# Patient Record
Sex: FEMALE | Race: WHITE | Employment: UNEMPLOYED
[De-identification: names, ages, dates, MRNs, and addresses within clinical notes are randomized per-mention and may not be internally consistent; named-entity substitution may affect disease eponyms.]

---

## 2017-08-08 PROBLEM — F33.2 MAJOR DEPRESSIVE DISORDER, RECURRENT SEVERE WITHOUT PSYCHOTIC FEATURES (HCC): Status: ACTIVE | Noted: 2017-08-08

## 2018-03-20 ENCOUNTER — NURSE TRIAGE (OUTPATIENT)
Dept: OTHER | Facility: CLINIC | Age: 33
End: 2018-03-20

## 2018-03-20 NOTE — TELEPHONE ENCOUNTER
Reason for Disposition   Message left on identified answering machine. Protocols used: NO CONTACT OR DUPLICATE CONTACT CALL-ADULT-     Attempted to reach caller, Renato Powell, to follow up on prior call from today. Message left to return call to RN Access.

## 2018-03-20 NOTE — TELEPHONE ENCOUNTER
Reason for Disposition   [1] Follow-up call to recent contact AND [2] information only call, no triage required    Protocols used: INFORMATION ONLY CALL-ADULT-KATHRYN    Spoke with [place] regarding her call earlier today about [place]. [facility] reports Rendell Crew is still a little \"off\" and is still refusing care. [facility] states she does not feel that Rendell Crew is unsafe at this time . 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 Informed [facility] to call back at any time if needed and that Rendell Crew can call at any time as well. [employer] phone number and explanation of this service explained, phone number declined at this time.

## 2018-03-20 NOTE — TELEPHONE ENCOUNTER
Reason for Disposition   Caller hangs up    Answer Assessment - Initial Assessment Questions  1. SITUATION:  Document reason for call. [facility] called for pt Syeda for chief complaint of anxiety attack. 2. BACKGROUND: Document any background information (e.g., prior calls, known psychiatric history)      No known psychiatric history or prior calls. 3. ASSESSMENT: Document your nursing assessment. 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 She was crying and not answering questions when asked. 4. RESPONSE: Document what your response or recommendation was. Answer Assessment - Initial Assessment Questions  1. CONCERN: \"What happened that made you call today? \"      panick attack, per caller  2. ANXIETY SYMPTOM SCREENING: \"Can you describe how you have been feeling? \"  (e.g., tense, restless, panicky, anxious, keyed up, trouble sleeping, trouble concentrating)      Crying hysterically, refusing to answer questions and \"kind of spacing out\", per caller  3. ONSET: \"How long have you been feeling this way? \"      About 30 minutes ago, per caller  4. RECURRENT: \"Have you felt this way before? \"  If yes: \"What happened that time? \" \"What helped these feelings go away in the past?\"       Not sure if it has happened before, per caller  5. RISK OF HARM - SUICIDAL IDEATION:  \"Do you ever have thoughts of hurting or killing yourself? \"  (e.g., yes, no, no but preoccupation with thoughts about death)   Caller states she does not know if pt ever had thoughts of harming self   6. RISK OF HARM - HOMICIDAL IDEATION:  \"Do you ever have thoughts of hurting or killing someone else? \"  (e.g., yes, no, no but preoccupation with thoughts about death)    - INTENT:  \"Do you have thoughts of hurting or killing someone right NOW? \" (e.g., yes, no, N/A)    - PLAN: \"Do you have a specific plan for how you would do this? \" (e.g., gun, knife, no plan, N/A)       *No Answer*  7. FUNCTIONAL IMPAIRMENT: \"How

## 2018-09-12 ENCOUNTER — HOSPITAL ENCOUNTER (EMERGENCY)
Age: 33
Discharge: ANOTHER HOSPITAL | End: 2018-09-13
Attending: EMERGENCY MEDICINE
Payer: COMMERCIAL

## 2018-09-12 DIAGNOSIS — R45.851 SUICIDAL IDEATION: Primary | ICD-10-CM

## 2018-09-12 DIAGNOSIS — F10.920 ACUTE ALCOHOLIC INTOXICATION WITHOUT COMPLICATION (HCC): ICD-10-CM

## 2018-09-12 LAB
ACETAMINOPHEN LEVEL: <5 MCG/ML (ref 10–30)
ALBUMIN SERPL-MCNC: 4.7 G/DL (ref 3.5–5.2)
ALP BLD-CCNC: 62 U/L (ref 35–104)
ALT SERPL-CCNC: 52 U/L (ref 0–32)
AMPHETAMINE SCREEN, URINE: NOT DETECTED
ANION GAP SERPL CALCULATED.3IONS-SCNC: 14 MMOL/L (ref 7–16)
AST SERPL-CCNC: 47 U/L (ref 0–31)
BARBITURATE SCREEN URINE: NOT DETECTED
BASOPHILS ABSOLUTE: 0.06 E9/L (ref 0–0.2)
BASOPHILS RELATIVE PERCENT: 0.9 % (ref 0–2)
BENZODIAZEPINE SCREEN, URINE: POSITIVE
BILIRUB SERPL-MCNC: 0.6 MG/DL (ref 0–1.2)
BILIRUBIN URINE: NEGATIVE
BLOOD, URINE: NEGATIVE
BUN BLDV-MCNC: 10 MG/DL (ref 6–20)
CALCIUM SERPL-MCNC: 8.7 MG/DL (ref 8.6–10.2)
CANNABINOID SCREEN URINE: NOT DETECTED
CHLORIDE BLD-SCNC: 102 MMOL/L (ref 98–107)
CHP ED QC CHECK: YES
CLARITY: CLEAR
CO2: 27 MMOL/L (ref 22–29)
COCAINE METABOLITE SCREEN URINE: NOT DETECTED
COLOR: NORMAL
CREAT SERPL-MCNC: 0.5 MG/DL (ref 0.5–1)
EKG ATRIAL RATE: 79 BPM
EKG P AXIS: 55 DEGREES
EKG P-R INTERVAL: 144 MS
EKG Q-T INTERVAL: 410 MS
EKG QRS DURATION: 86 MS
EKG QTC CALCULATION (BAZETT): 470 MS
EKG R AXIS: 17 DEGREES
EKG T AXIS: 48 DEGREES
EKG VENTRICULAR RATE: 79 BPM
EOSINOPHILS ABSOLUTE: 0.02 E9/L (ref 0.05–0.5)
EOSINOPHILS RELATIVE PERCENT: 0.3 % (ref 0–6)
ETHANOL: 226 MG/DL (ref 0–0.08)
ETHANOL: 376 MG/DL (ref 0–0.08)
GFR AFRICAN AMERICAN: >60
GFR NON-AFRICAN AMERICAN: >60 ML/MIN/1.73
GLUCOSE BLD-MCNC: 65 MG/DL (ref 74–109)
GLUCOSE URINE: NEGATIVE MG/DL
HCT VFR BLD CALC: 38 % (ref 34–48)
HEMOGLOBIN: 12.9 G/DL (ref 11.5–15.5)
IMMATURE GRANULOCYTES #: 0.01 E9/L
IMMATURE GRANULOCYTES %: 0.2 % (ref 0–5)
KETONES, URINE: NEGATIVE MG/DL
LEUKOCYTE ESTERASE, URINE: NEGATIVE
LYMPHOCYTES ABSOLUTE: 3.68 E9/L (ref 1.5–4)
LYMPHOCYTES RELATIVE PERCENT: 56.2 % (ref 20–42)
MCH RBC QN AUTO: 31.2 PG (ref 26–35)
MCHC RBC AUTO-ENTMCNC: 33.9 % (ref 32–34.5)
MCV RBC AUTO: 92 FL (ref 80–99.9)
METHADONE SCREEN, URINE: NOT DETECTED
MONOCYTES ABSOLUTE: 0.55 E9/L (ref 0.1–0.95)
MONOCYTES RELATIVE PERCENT: 8.4 % (ref 2–12)
NEUTROPHILS ABSOLUTE: 2.23 E9/L (ref 1.8–7.3)
NEUTROPHILS RELATIVE PERCENT: 34 % (ref 43–80)
NITRITE, URINE: NEGATIVE
OPIATE SCREEN URINE: NOT DETECTED
PDW BLD-RTO: 13.6 FL (ref 11.5–15)
PH UA: 6.5 (ref 5–9)
PHENCYCLIDINE SCREEN URINE: NOT DETECTED
PLATELET # BLD: 255 E9/L (ref 130–450)
PMV BLD AUTO: 8.6 FL (ref 7–12)
POTASSIUM SERPL-SCNC: 4.7 MMOL/L (ref 3.5–5)
PREGNANCY TEST URINE, POC: NEGATIVE
PROPOXYPHENE SCREEN: NOT DETECTED
PROTEIN UA: NEGATIVE MG/DL
RBC # BLD: 4.13 E12/L (ref 3.5–5.5)
SALICYLATE, SERUM: <0.3 MG/DL (ref 0–30)
SODIUM BLD-SCNC: 143 MMOL/L (ref 132–146)
SPECIFIC GRAVITY UA: <=1.005 (ref 1–1.03)
T4 FREE: 1.29 NG/DL (ref 0.93–1.7)
TOTAL PROTEIN: 7.8 G/DL (ref 6.4–8.3)
TRICYCLIC ANTIDEPRESSANTS SCREEN SERUM: NEGATIVE NG/ML
TSH SERPL DL<=0.05 MIU/L-ACNC: 1.6 UIU/ML (ref 0.27–4.2)
UROBILINOGEN, URINE: 0.2 E.U./DL
WBC # BLD: 6.6 E9/L (ref 4.5–11.5)

## 2018-09-12 PROCEDURE — 84439 ASSAY OF FREE THYROXINE: CPT

## 2018-09-12 PROCEDURE — 96375 TX/PRO/DX INJ NEW DRUG ADDON: CPT

## 2018-09-12 PROCEDURE — 85025 COMPLETE CBC W/AUTO DIFF WBC: CPT

## 2018-09-12 PROCEDURE — 80053 COMPREHEN METABOLIC PANEL: CPT

## 2018-09-12 PROCEDURE — 6360000002 HC RX W HCPCS

## 2018-09-12 PROCEDURE — 80307 DRUG TEST PRSMV CHEM ANLYZR: CPT

## 2018-09-12 PROCEDURE — 2580000003 HC RX 258: Performed by: EMERGENCY MEDICINE

## 2018-09-12 PROCEDURE — 36415 COLL VENOUS BLD VENIPUNCTURE: CPT

## 2018-09-12 PROCEDURE — G0480 DRUG TEST DEF 1-7 CLASSES: HCPCS

## 2018-09-12 PROCEDURE — 96374 THER/PROPH/DIAG INJ IV PUSH: CPT

## 2018-09-12 PROCEDURE — 93005 ELECTROCARDIOGRAM TRACING: CPT | Performed by: EMERGENCY MEDICINE

## 2018-09-12 PROCEDURE — 99285 EMERGENCY DEPT VISIT HI MDM: CPT

## 2018-09-12 PROCEDURE — 96372 THER/PROPH/DIAG INJ SC/IM: CPT

## 2018-09-12 PROCEDURE — 6370000000 HC RX 637 (ALT 250 FOR IP): Performed by: EMERGENCY MEDICINE

## 2018-09-12 PROCEDURE — 81003 URINALYSIS AUTO W/O SCOPE: CPT

## 2018-09-12 PROCEDURE — 2500000003 HC RX 250 WO HCPCS

## 2018-09-12 PROCEDURE — 84443 ASSAY THYROID STIM HORMONE: CPT

## 2018-09-12 PROCEDURE — 6360000002 HC RX W HCPCS: Performed by: EMERGENCY MEDICINE

## 2018-09-12 RX ORDER — LORAZEPAM 2 MG/ML
2 INJECTION INTRAMUSCULAR
Status: DISCONTINUED | OUTPATIENT
Start: 2018-09-12 | End: 2018-09-13 | Stop reason: HOSPADM

## 2018-09-12 RX ORDER — SODIUM CHLORIDE 0.9 % (FLUSH) 0.9 %
10 SYRINGE (ML) INJECTION EVERY 12 HOURS SCHEDULED
Status: DISCONTINUED | OUTPATIENT
Start: 2018-09-12 | End: 2018-09-13 | Stop reason: HOSPADM

## 2018-09-12 RX ORDER — LORAZEPAM 2 MG/ML
4 INJECTION INTRAMUSCULAR
Status: DISCONTINUED | OUTPATIENT
Start: 2018-09-12 | End: 2018-09-13 | Stop reason: HOSPADM

## 2018-09-12 RX ORDER — MIDAZOLAM HYDROCHLORIDE 1 MG/ML
INJECTION INTRAMUSCULAR; INTRAVENOUS
Status: COMPLETED
Start: 2018-09-12 | End: 2018-09-12

## 2018-09-12 RX ORDER — LORAZEPAM 1 MG/1
3 TABLET ORAL
Status: DISCONTINUED | OUTPATIENT
Start: 2018-09-12 | End: 2018-09-13 | Stop reason: HOSPADM

## 2018-09-12 RX ORDER — 0.9 % SODIUM CHLORIDE 0.9 %
1000 INTRAVENOUS SOLUTION INTRAVENOUS ONCE
Status: COMPLETED | OUTPATIENT
Start: 2018-09-12 | End: 2018-09-12

## 2018-09-12 RX ORDER — LORAZEPAM 2 MG/ML
1 INJECTION INTRAMUSCULAR ONCE
Status: COMPLETED | OUTPATIENT
Start: 2018-09-12 | End: 2018-09-12

## 2018-09-12 RX ORDER — LORAZEPAM 2 MG/ML
1 INJECTION INTRAMUSCULAR
Status: DISCONTINUED | OUTPATIENT
Start: 2018-09-12 | End: 2018-09-13 | Stop reason: HOSPADM

## 2018-09-12 RX ORDER — SODIUM CHLORIDE 9 MG/ML
INJECTION, SOLUTION INTRAVENOUS CONTINUOUS
Status: DISCONTINUED | OUTPATIENT
Start: 2018-09-12 | End: 2018-09-13 | Stop reason: HOSPADM

## 2018-09-12 RX ORDER — DEXTROSE MONOHYDRATE 25 G/50ML
25 INJECTION, SOLUTION INTRAVENOUS PRN
Status: DISCONTINUED | OUTPATIENT
Start: 2018-09-12 | End: 2018-09-13 | Stop reason: HOSPADM

## 2018-09-12 RX ORDER — MIDAZOLAM HYDROCHLORIDE 1 MG/ML
2 INJECTION INTRAMUSCULAR; INTRAVENOUS ONCE
Status: COMPLETED | OUTPATIENT
Start: 2018-09-12 | End: 2018-09-12

## 2018-09-12 RX ORDER — SODIUM CHLORIDE 0.9 % (FLUSH) 0.9 %
10 SYRINGE (ML) INJECTION PRN
Status: DISCONTINUED | OUTPATIENT
Start: 2018-09-12 | End: 2018-09-13 | Stop reason: HOSPADM

## 2018-09-12 RX ORDER — CHLORDIAZEPOXIDE HYDROCHLORIDE 25 MG/1
50 CAPSULE, GELATIN COATED ORAL ONCE
Status: COMPLETED | OUTPATIENT
Start: 2018-09-12 | End: 2018-09-12

## 2018-09-12 RX ORDER — LORAZEPAM 1 MG/1
1 TABLET ORAL
Status: DISCONTINUED | OUTPATIENT
Start: 2018-09-12 | End: 2018-09-13 | Stop reason: HOSPADM

## 2018-09-12 RX ORDER — LORAZEPAM 1 MG/1
2 TABLET ORAL
Status: DISCONTINUED | OUTPATIENT
Start: 2018-09-12 | End: 2018-09-13 | Stop reason: HOSPADM

## 2018-09-12 RX ORDER — KETAMINE HYDROCHLORIDE 10 MG/ML
150 INJECTION, SOLUTION INTRAMUSCULAR; INTRAVENOUS ONCE
Status: DISCONTINUED | OUTPATIENT
Start: 2018-09-12 | End: 2018-09-12

## 2018-09-12 RX ORDER — LORAZEPAM 1 MG/1
4 TABLET ORAL
Status: DISCONTINUED | OUTPATIENT
Start: 2018-09-12 | End: 2018-09-13 | Stop reason: HOSPADM

## 2018-09-12 RX ORDER — CHLORDIAZEPOXIDE HYDROCHLORIDE 25 MG/1
50 CAPSULE, GELATIN COATED ORAL ONCE
Status: DISCONTINUED | OUTPATIENT
Start: 2018-09-12 | End: 2018-09-12

## 2018-09-12 RX ORDER — LORAZEPAM 2 MG/ML
3 INJECTION INTRAMUSCULAR
Status: DISCONTINUED | OUTPATIENT
Start: 2018-09-12 | End: 2018-09-13 | Stop reason: HOSPADM

## 2018-09-12 RX ORDER — KETAMINE HYDROCHLORIDE 100 MG/ML
150 INJECTION, SOLUTION INTRAMUSCULAR; INTRAVENOUS ONCE
Status: COMPLETED | OUTPATIENT
Start: 2018-09-12 | End: 2018-09-12

## 2018-09-12 RX ORDER — KETAMINE HYDROCHLORIDE 100 MG/ML
INJECTION, SOLUTION INTRAMUSCULAR; INTRAVENOUS
Status: COMPLETED
Start: 2018-09-12 | End: 2018-09-12

## 2018-09-12 RX ADMIN — SODIUM CHLORIDE 1000 ML: 9 INJECTION, SOLUTION INTRAVENOUS at 11:22

## 2018-09-12 RX ADMIN — MIDAZOLAM HYDROCHLORIDE 2 MG: 1 INJECTION, SOLUTION INTRAMUSCULAR; INTRAVENOUS at 12:57

## 2018-09-12 RX ADMIN — LORAZEPAM 1 MG: 1 TABLET ORAL at 23:02

## 2018-09-12 RX ADMIN — KETAMINE HYDROCHLORIDE 150 MG: 100 INJECTION, SOLUTION INTRAMUSCULAR; INTRAVENOUS at 12:01

## 2018-09-12 RX ADMIN — KETAMINE HYDROCHLORIDE 150 MG: 100 INJECTION, SOLUTION, CONCENTRATE INTRAMUSCULAR; INTRAVENOUS at 12:01

## 2018-09-12 RX ADMIN — DEXTROSE MONOHYDRATE 25 G: 25 INJECTION, SOLUTION INTRAVENOUS at 12:46

## 2018-09-12 RX ADMIN — LORAZEPAM 1 MG: 2 INJECTION INTRAMUSCULAR; INTRAVENOUS at 16:37

## 2018-09-12 RX ADMIN — MIDAZOLAM HYDROCHLORIDE 2 MG: 1 INJECTION INTRAMUSCULAR; INTRAVENOUS at 12:57

## 2018-09-12 RX ADMIN — SODIUM CHLORIDE: 9 INJECTION, SOLUTION INTRAVENOUS at 13:05

## 2018-09-12 RX ADMIN — CHLORDIAZEPOXIDE HYDROCHLORIDE 50 MG: 25 CAPSULE ORAL at 16:34

## 2018-09-12 ASSESSMENT — ENCOUNTER SYMPTOMS
BLOOD IN STOOL: 0
COUGH: 0
BACK PAIN: 0
ABDOMINAL PAIN: 0
VOMITING: 0
NAUSEA: 0
SHORTNESS OF BREATH: 0

## 2018-09-12 ASSESSMENT — PAIN SCALES - GENERAL: PAINLEVEL_OUTOF10: 0

## 2018-09-12 NOTE — ED NOTES
Placing seizure pads on pt's bed and she became very agitated and stated she wanted something for her withdrawal. Told pt she does have something ordered and that we would get in now. She began screaming that we weren't helping her and would not calm down, security was called because pt ripped out her IV and yelled that she was leaving and became very aggressive. Additional staff came into room to help with pt and she became even more belligerent. Dr. Martín Edgar ordered ketamine to be given IM. PD explained that pt was pink slipped and she still stated \" I am allowed to leave I am smarter than all of you in this room. \" Pt explained that she was a harm to herself because she stated this when she came in to the ED and we could not let her leave.      Sandford Schlatter, RN  09/12/18 2048

## 2018-09-12 NOTE — ED PROVIDER NOTES
Hans Mccracken is a 34 y.o female who presents to the ED with a complaint of suicidal ideation. Patient states that she is having suicidal ideation and states that she wants to cut herself. She denies any thoughts of harming others. She states that she is an alcoholic and drinks daily. She admits to drinking a pint of vodka daily and last had a drink 2 hours ago. She states that she does hear weird voices only when she has been drinking. She denies any suicidal plan in the past. She states she wants help with her alcohol problem. She is taking Citalopram but states she takes it sporadically. Review of Systems   Constitutional: Negative for chills and fever. Respiratory: Negative for cough and shortness of breath. Cardiovascular: Negative for chest pain. Gastrointestinal: Negative for abdominal pain, blood in stool, nausea and vomiting. Genitourinary: Negative for dysuria and frequency. Musculoskeletal: Negative for back pain. Skin: Negative for rash. Neurological: Negative for weakness and headaches. Psychiatric/Behavioral: Positive for suicidal ideas. The patient is nervous/anxious. All other systems reviewed and are negative. Physical Exam   Constitutional: She is oriented to person, place, and time. She appears well-developed and well-nourished. Smells of alcoholic beverage   HENT:   Head: Normocephalic and atraumatic. Eyes: Conjunctivae are normal.   Neck: Normal range of motion. Neck supple. Cardiovascular: Normal rate, regular rhythm and normal heart sounds. No murmur heard. Pulmonary/Chest: Effort normal and breath sounds normal. No respiratory distress. She has no wheezes. She has no rales. Abdominal: Soft. Bowel sounds are normal. There is no tenderness. There is no rebound and no guarding. Musculoskeletal: She exhibits no edema. Neurological: She is alert and oriented to person, place, and time. No cranial nerve deficit.  Coordination normal.   Skin: Disorder in her maternal aunt and mother; Depression in her maternal aunt and mother. The patients home medications have been reviewed. Allergies: Patient has no known allergies.     -------------------------------------------------- RESULTS -------------------------------------------------  Labs:  Results for orders placed or performed during the hospital encounter of 09/12/18   CBC Auto Differential   Result Value Ref Range    WBC 6.6 4.5 - 11.5 E9/L    RBC 4.13 3.50 - 5.50 E12/L    Hemoglobin 12.9 11.5 - 15.5 g/dL    Hematocrit 38.0 34.0 - 48.0 %    MCV 92.0 80.0 - 99.9 fL    MCH 31.2 26.0 - 35.0 pg    MCHC 33.9 32.0 - 34.5 %    RDW 13.6 11.5 - 15.0 fL    Platelets 818 162 - 889 E9/L    MPV 8.6 7.0 - 12.0 fL    Neutrophils % 34.0 (L) 43.0 - 80.0 %    Immature Granulocytes % 0.2 0.0 - 5.0 %    Lymphocytes % 56.2 (H) 20.0 - 42.0 %    Monocytes % 8.4 2.0 - 12.0 %    Eosinophils % 0.3 0.0 - 6.0 %    Basophils % 0.9 0.0 - 2.0 %    Neutrophils # 2.23 1.80 - 7.30 E9/L    Immature Granulocytes # 0.01 E9/L    Lymphocytes # 3.68 1.50 - 4.00 E9/L    Monocytes # 0.55 0.10 - 0.95 E9/L    Eosinophils # 0.02 (L) 0.05 - 0.50 E9/L    Basophils # 0.06 0.00 - 0.20 E9/L   Comprehensive Metabolic Panel   Result Value Ref Range    Sodium 143 132 - 146 mmol/L    Potassium 4.7 3.5 - 5.0 mmol/L    Chloride 102 98 - 107 mmol/L    CO2 27 22 - 29 mmol/L    Anion Gap 14 7 - 16 mmol/L    Glucose 65 (L) 74 - 109 mg/dL    BUN 10 6 - 20 mg/dL    CREATININE 0.5 0.5 - 1.0 mg/dL    GFR Non-African American >60 >=60 mL/min/1.73    GFR African American >60     Calcium 8.7 8.6 - 10.2 mg/dL    Total Protein 7.8 6.4 - 8.3 g/dL    Alb 4.7 3.5 - 5.2 g/dL    Total Bilirubin 0.6 0.0 - 1.2 mg/dL    Alkaline Phosphatase 62 35 - 104 U/L    ALT 52 (H) 0 - 32 U/L    AST 47 (H) 0 - 31 U/L   Urinalysis   Result Value Ref Range    Color, UA Straw Straw/Yellow    Clarity, UA Clear Clear    Glucose, Ur Negative Negative mg/dL    Bilirubin Urine Negative

## 2018-09-13 VITALS
WEIGHT: 120 LBS | TEMPERATURE: 98 F | HEART RATE: 66 BPM | HEIGHT: 60 IN | SYSTOLIC BLOOD PRESSURE: 157 MMHG | OXYGEN SATURATION: 99 % | DIASTOLIC BLOOD PRESSURE: 80 MMHG | RESPIRATION RATE: 17 BRPM | BODY MASS INDEX: 23.56 KG/M2

## 2018-09-13 LAB — ETHANOL: <10 MG/DL (ref 0–0.08)

## 2018-09-13 NOTE — ED NOTES
Pt. Has been accepted to [facility] [address], with Dr. Ana Hamilton accepting, N-N [phone], transfer line will set up transport.      Nenita San RN  09/13/18 7050

## 2018-09-13 NOTE — ED NOTES
[facility] ambulance here to transport patient to [facility], patient calm and cooperative, ate lunch , denies suicidal ideation     Desirae Watts RN  09/13/18 2538

## 2018-09-13 NOTE — ED NOTES
Notified Dr. Tino Peterson that pt's IV was not functional, she ordered fluids and IV to be discontinued.      Nixon Levine RN  09/12/18 2021

## 2018-09-13 NOTE — ED NOTES
Called  The [facility], made them aware that patient blood alcohol level was negative. RN stated she will put her on a waiting list, because she is placing her last 2 beds at this time. Also called the access center, and made them aware.       Enzo Morin RN  09/13/18 2210

## 2018-09-13 NOTE — ED NOTES
Patient agreeable to transfer to [facility], emotional support given, lunch tray ordered, denies suicidal ideation, agrees to seek staff if suicidal thoughts occur     Jeison Villegas RN  09/13/18 1257

## 2018-09-13 NOTE — CARE COORDINATION
Social Work/Transition of Care:    SW aware pt has arrived to the ED due to [facility] and Alcohol Intoxication, Once pt is medically cleared and her BAL is below 100 will need referred for inpatient behavioral health admission.     Electronically signed by Zachary Mccann on 0/32/5835 at 1:49 PM
Social Work/Transition of Care:    SW notified pt has been accepted at [facility], SW completed necessary paperwork and placed in soft chart.     Electronically signed by Seda Garrido on 0/99/6963 at 1:20 PM
there are currently no Female beds available and they are looking at the next openings on the 14th  or 15th. Pt can be referred to the [facility] once her BAL is at or below 100.       Electronically signed by Edin Shipman on 0/94/2731 at 8:09 PM

## 2018-09-18 LAB
7-AMINOCLONAZEPAM, URINE: <5 NG/ML
ALPHA-HYDROXYALPRAZOLAM, URINE: <5 NG/ML
ALPHA-HYDROXYMIDAZOLAM, URINE: <20 NG/ML
ALPRAZOLAM, URINE: <5 NG/ML
CHLORDIAZEPOXIDE, URINE: 46 NG/ML
CLONAZEPAM, URINE: <5 NG/ML
DIAZEPAM, URINE: <20 NG/ML
LORAZEPAM, URINE: <20 NG/ML
MIDAZOLAM, URINE: <20 NG/ML
NORDIAZEPAM, URINE: <20 NG/ML
OXAZEPAM, URINE: <20 NG/ML
TEMAZEPAM, URINE: <20 NG/ML

## 2018-10-02 ENCOUNTER — HOSPITAL ENCOUNTER (INPATIENT)
Age: 33
LOS: 2 days | Discharge: HOME | DRG: 885 | End: 2018-10-05
Attending: EMERGENCY MEDICINE | Admitting: PSYCHIATRY & NEUROLOGY
Payer: COMMERCIAL
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
## 2018-10-02 DIAGNOSIS — F32.A DEPRESSION, UNSPECIFIED DEPRESSION TYPE: ICD-10-CM

## 2018-10-02 DIAGNOSIS — R45.851 SUICIDAL IDEATION: Primary | ICD-10-CM

## 2018-10-02 LAB
ACETAMINOPHEN LEVEL: <5 MCG/ML (ref 10–30)
ALBUMIN SERPL-MCNC: 4.8 G/DL (ref 3.5–5.2)
ALP BLD-CCNC: 56 U/L (ref 35–104)
ALT SERPL-CCNC: 27 U/L (ref 0–32)
AMPHETAMINE SCREEN, URINE: NOT DETECTED
ANION GAP SERPL CALCULATED.3IONS-SCNC: 14 MMOL/L (ref 7–16)
AST SERPL-CCNC: 19 U/L (ref 0–31)
BARBITURATE SCREEN URINE: POSITIVE
BASOPHILS ABSOLUTE: 0.08 E9/L (ref 0–0.2)
BASOPHILS RELATIVE PERCENT: 1.3 % (ref 0–2)
BENZODIAZEPINE SCREEN, URINE: NOT DETECTED
BILIRUB SERPL-MCNC: <0.2 MG/DL (ref 0–1.2)
BUN BLDV-MCNC: 9 MG/DL (ref 6–20)
CALCIUM SERPL-MCNC: 9.3 MG/DL (ref 8.6–10.2)
CANNABINOID SCREEN URINE: NOT DETECTED
CHLORIDE BLD-SCNC: 108 MMOL/L (ref 98–107)
CO2: 23 MMOL/L (ref 22–29)
COCAINE METABOLITE SCREEN URINE: NOT DETECTED
CREAT SERPL-MCNC: 0.6 MG/DL (ref 0.5–1)
EOSINOPHILS ABSOLUTE: 0.03 E9/L (ref 0.05–0.5)
EOSINOPHILS RELATIVE PERCENT: 0.5 % (ref 0–6)
ETHANOL: 274 MG/DL (ref 0–0.08)
GFR AFRICAN AMERICAN: >60
GFR NON-AFRICAN AMERICAN: >60 ML/MIN/1.73
GLUCOSE BLD-MCNC: 105 MG/DL (ref 74–109)
HCT VFR BLD CALC: 42 % (ref 34–48)
HEMOGLOBIN: 13.7 G/DL (ref 11.5–15.5)
IMMATURE GRANULOCYTES #: 0.01 E9/L
IMMATURE GRANULOCYTES %: 0.2 % (ref 0–5)
LYMPHOCYTES ABSOLUTE: 2.91 E9/L (ref 1.5–4)
LYMPHOCYTES RELATIVE PERCENT: 48.3 % (ref 20–42)
MCH RBC QN AUTO: 29.8 PG (ref 26–35)
MCHC RBC AUTO-ENTMCNC: 32.6 % (ref 32–34.5)
MCV RBC AUTO: 91.5 FL (ref 80–99.9)
METHADONE SCREEN, URINE: NOT DETECTED
MONOCYTES ABSOLUTE: 0.34 E9/L (ref 0.1–0.95)
MONOCYTES RELATIVE PERCENT: 5.6 % (ref 2–12)
NEUTROPHILS ABSOLUTE: 2.66 E9/L (ref 1.8–7.3)
NEUTROPHILS RELATIVE PERCENT: 44.1 % (ref 43–80)
OPIATE SCREEN URINE: NOT DETECTED
PDW BLD-RTO: 13.5 FL (ref 11.5–15)
PHENCYCLIDINE SCREEN URINE: NOT DETECTED
PLATELET # BLD: 309 E9/L (ref 130–450)
PMV BLD AUTO: 8.8 FL (ref 7–12)
POTASSIUM SERPL-SCNC: 3.5 MMOL/L (ref 3.5–5)
PROPOXYPHENE SCREEN: NOT DETECTED
RBC # BLD: 4.59 E12/L (ref 3.5–5.5)
SALICYLATE, SERUM: <0.3 MG/DL (ref 0–30)
SODIUM BLD-SCNC: 145 MMOL/L (ref 132–146)
TOTAL PROTEIN: 7.5 G/DL (ref 6.4–8.3)
TRICYCLIC ANTIDEPRESSANTS SCREEN SERUM: NEGATIVE NG/ML
WBC # BLD: 6 E9/L (ref 4.5–11.5)

## 2018-10-02 PROCEDURE — 96372 THER/PROPH/DIAG INJ SC/IM: CPT

## 2018-10-02 PROCEDURE — 80053 COMPREHEN METABOLIC PANEL: CPT

## 2018-10-02 PROCEDURE — 85025 COMPLETE CBC W/AUTO DIFF WBC: CPT

## 2018-10-02 PROCEDURE — 99285 EMERGENCY DEPT VISIT HI MDM: CPT

## 2018-10-02 PROCEDURE — G0480 DRUG TEST DEF 1-7 CLASSES: HCPCS

## 2018-10-02 PROCEDURE — 81025 URINE PREGNANCY TEST: CPT

## 2018-10-02 PROCEDURE — 80307 DRUG TEST PRSMV CHEM ANLYZR: CPT

## 2018-10-02 PROCEDURE — 36415 COLL VENOUS BLD VENIPUNCTURE: CPT

## 2018-10-02 PROCEDURE — 6360000002 HC RX W HCPCS: Performed by: EMERGENCY MEDICINE

## 2018-10-02 RX ORDER — DIPHENHYDRAMINE HYDROCHLORIDE 50 MG/ML
50 INJECTION INTRAMUSCULAR; INTRAVENOUS ONCE
Status: COMPLETED | OUTPATIENT
Start: 2018-10-02 | End: 2018-10-02

## 2018-10-02 RX ORDER — ZIPRASIDONE MESYLATE 20 MG/ML
10 INJECTION, POWDER, LYOPHILIZED, FOR SOLUTION INTRAMUSCULAR ONCE
Status: DISCONTINUED | OUTPATIENT
Start: 2018-10-02 | End: 2018-10-04

## 2018-10-02 RX ORDER — LORAZEPAM 2 MG/ML
2 INJECTION INTRAMUSCULAR ONCE
Status: COMPLETED | OUTPATIENT
Start: 2018-10-02 | End: 2018-10-02

## 2018-10-02 RX ADMIN — LORAZEPAM 2 MG: 2 INJECTION INTRAMUSCULAR; INTRAVENOUS at 18:20

## 2018-10-02 RX ADMIN — DIPHENHYDRAMINE HYDROCHLORIDE 50 MG: 50 INJECTION, SOLUTION INTRAMUSCULAR; INTRAVENOUS at 18:20

## 2018-10-03 PROBLEM — F32.9 MAJOR DEPRESSIVE EPISODE: Status: RESOLVED | Noted: 2018-10-03 | Resolved: 2018-10-03

## 2018-10-03 PROBLEM — F32.9 MAJOR DEPRESSIVE EPISODE: Status: ACTIVE | Noted: 2018-10-03

## 2018-10-03 PROBLEM — F33.2 MDD (MAJOR DEPRESSIVE DISORDER), RECURRENT SEVERE, WITHOUT PSYCHOSIS (HCC): Status: ACTIVE | Noted: 2018-10-03

## 2018-10-03 PROBLEM — F33.2 MAJOR DEPRESSIVE DISORDER, RECURRENT SEVERE WITHOUT PSYCHOTIC FEATURES (HCC): Status: RESOLVED | Noted: 2017-08-08 | Resolved: 2018-10-03

## 2018-10-03 LAB
ETHANOL: 33 MG/DL (ref 0–0.08)
HCG(URINE) PREGNANCY TEST: NEGATIVE

## 2018-10-03 PROCEDURE — G0480 DRUG TEST DEF 1-7 CLASSES: HCPCS

## 2018-10-03 PROCEDURE — 1240000000 HC EMOTIONAL WELLNESS R&B

## 2018-10-03 PROCEDURE — 6370000000 HC RX 637 (ALT 250 FOR IP): Performed by: PSYCHIATRY & NEUROLOGY

## 2018-10-03 PROCEDURE — 99222 1ST HOSP IP/OBS MODERATE 55: CPT | Performed by: PSYCHIATRY & NEUROLOGY

## 2018-10-03 PROCEDURE — 36415 COLL VENOUS BLD VENIPUNCTURE: CPT

## 2018-10-03 RX ORDER — PROPRANOLOL HYDROCHLORIDE 40 MG/1
20 TABLET ORAL 2 TIMES DAILY
COMMUNITY

## 2018-10-03 RX ORDER — PERPHENAZINE 2 MG/1
2 TABLET, FILM COATED ORAL 2 TIMES DAILY WITH MEALS
Status: DISCONTINUED | OUTPATIENT
Start: 2018-10-03 | End: 2018-10-05 | Stop reason: HOSPADM

## 2018-10-03 RX ORDER — NALTREXONE HYDROCHLORIDE 50 MG/1
50 TABLET, FILM COATED ORAL NIGHTLY
Status: ON HOLD | COMMUNITY
End: 2018-10-05 | Stop reason: HOSPADM

## 2018-10-03 RX ORDER — PROPRANOLOL HYDROCHLORIDE 20 MG/1
20 TABLET ORAL 2 TIMES DAILY
Status: DISCONTINUED | OUTPATIENT
Start: 2018-10-03 | End: 2018-10-05 | Stop reason: HOSPADM

## 2018-10-03 RX ORDER — ESCITALOPRAM OXALATE 10 MG/1
10 TABLET ORAL DAILY
Status: DISCONTINUED | OUTPATIENT
Start: 2018-10-03 | End: 2018-10-03

## 2018-10-03 RX ORDER — NALTREXONE HYDROCHLORIDE 50 MG/1
50 TABLET, FILM COATED ORAL NIGHTLY
Status: DISCONTINUED | OUTPATIENT
Start: 2018-10-03 | End: 2018-10-05 | Stop reason: HOSPADM

## 2018-10-03 RX ORDER — ESCITALOPRAM OXALATE 10 MG/1
10 TABLET ORAL DAILY
Status: ON HOLD | COMMUNITY
End: 2018-10-05 | Stop reason: HOSPADM

## 2018-10-03 RX ADMIN — PROPRANOLOL HYDROCHLORIDE 20 MG: 20 TABLET ORAL at 14:16

## 2018-10-03 RX ADMIN — NALTREXONE HYDROCHLORIDE 50 MG: 50 TABLET, FILM COATED ORAL at 23:21

## 2018-10-03 RX ADMIN — SERTRALINE 25 MG: 50 TABLET, FILM COATED ORAL at 23:21

## 2018-10-03 RX ADMIN — SERTRALINE 25 MG: 50 TABLET, FILM COATED ORAL at 14:16

## 2018-10-03 ASSESSMENT — PAIN SCALES - GENERAL
PAINLEVEL_OUTOF10: 0

## 2018-10-03 ASSESSMENT — SLEEP AND FATIGUE QUESTIONNAIRES
DO YOU HAVE DIFFICULTY SLEEPING: NO
DO YOU USE A SLEEP AID: NO
AVERAGE NUMBER OF SLEEP HOURS: 8
AVERAGE NUMBER OF SLEEP HOURS: 8
DO YOU USE A SLEEP AID: NO
DO YOU HAVE DIFFICULTY SLEEPING: NO

## 2018-10-03 ASSESSMENT — PATIENT HEALTH QUESTIONNAIRE - PHQ9
SUM OF ALL RESPONSES TO PHQ QUESTIONS 1-9: 5
SUM OF ALL RESPONSES TO PHQ QUESTIONS 1-9: 7

## 2018-10-03 ASSESSMENT — LIFESTYLE VARIABLES
HISTORY_ALCOHOL_USE: YES
HISTORY_ALCOHOL_USE: YES

## 2018-10-03 NOTE — PROGRESS NOTES
UP ON UNIT. ATTENDS GROUPS. PLEASANT MOOD. DENIES SUICIDAL IDEATION. DENIES WITHDRAWAL SYMPTOMS. IN CONTROL. NO UNIT PROBLEMS.

## 2018-10-03 NOTE — ED NOTES
Patient is a 34year old female who presented to the ED for suicidal ideations. Patient reports financial and relationship stressors. 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 History of alcoholism for years. Patient left [facility] (rehab) against staff approval yesterday. She reports being there three times this year. Patient needs to be enrolled into a program for 28 days and will get 4 drug screens per month for the next year. Last psychiatric admission to [facility] on 9/12 for \"the same thing\". Patient treats with a counselor at [facility] in [place]. She does not currently treat with a psychiatrist.   History of alcoholism and anxiety. Denies past suicide attempts. She reports taking her medications as prescribed (Lexapro, Inderal, and Naltrexone)   Denies drug abuse. Reports good support, sleep and appetite. Calm, cooperative. Affect WNL. Mood depressed. Denies SI, HI and hallucinations    Patient is aware she is pink slipped and psychiatrist will be called to review for admission. Verbalized understanding.              Toya Garrett RN  10/03/18 5588

## 2018-10-03 NOTE — CARE COORDINATION
VERITO met with pt to complete CSSR-S and biopsychosocial assessment. 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 Pt states she must be in treatment for 1 month in order to obtain license back. She reports she is  from  at this time, has no children. Pt reports she was recently released from [facility] for treatment and upon discharge would like to follow with them outpatient. Pt states she is active with [facility] for counseling. Pt reports she drinks 7+ shots (drinks out of bottle) of Vodka. She reports she often blacks out and cannot remember what happened the night before. Pt reports withdrawal symptoms in the past but none recently. She reports she has been prescribed Naltrexone for her drinking but may be interested in Antabuse, she reports she will discuss with  upon tx team meeting. She reports no one has gotten harmed in relation to her drinking. Pt states her relationship with her parents and 3 younger siblings is functional but is strained due to her drinking. Pt reports she has experienced job loss, loss of relationship, strained relationship, loss of trust.  Pt did verbalize that she believes her father has hx of alcoholism she states he is employed and does not allow his drinking to interfere with his business but she did state he drinks daily. Pt denies drug use, denies V/A Hallucinations, delusions. Pt denies any previous attempts or thoughts of harming self. Pt states she sleeps and eats well, she reports when she is not drinking she sleeps 8 hrs; if she drinks she states she is unsure as she blacksout.     VERITO to obtain follow-up appointments on discharge and provide follow-up with [facility] OP

## 2018-10-03 NOTE — PROGRESS NOTES
PT. ADMITTED TO THE UNIT TO ROOM 7528 FROM E.R., POST ETOH INTOXICATION WITH SUICIDAL IDEATION. PT. PRESENTS IN GOOD CONTROL, ORIENTED, AND DENIES SUICIDAL IDEATION. PT. WAS COOPERATIVE TO ASSESSMENTS, WAS ORIENTED TO ROOM, AND IS ADJUSTING WELL TO UNIT. DENIES ANY ETOH WITHDRAWAL SYMPTOMS AT THIS TIME. `[facility]  Admission Note     Admission Type:   Admission Type:  Involuntary    Reason for admission:  Reason for Admission: suicidal ideation, ETOH intoxication, abuse    PATIENT STRENGTHS:  Strengths: Communication    Patient Strengths and Limitations:  Limitations: Inappropriate/potentially harmful leisure interests    Addictive Behavior:   Addictive Behavior  In the past 3 months, have you felt or has someone told you that you have a problem with:  : None  Do you have a history of Chemical Use?: No  Do you have a history of Alcohol Use?: Yes  Do you have a history of Street Drug Abuse?: No  Histroy of Prescripton Drug Abuse?: No    Medical Problems:   Past Medical History:   Diagnosis Date    [facility] dependency ([facility]     Anxiety and depression     Psychiatric problem        Status EXAM:  Status and Exam  Normal: No  Facial Expression: Worried  Affect: Appropriate  Level of Consciousness: Alert  Mood:Normal: No  Mood: Anxious  Motor Activity:Normal: Yes  Interview Behavior: Cooperative  Preception: Neligh to Person, Euel Alpers to Time, Neligh to Place, Neligh to Situation  Attention:Normal: Yes  Thought Content:Normal: Yes  Hallucinations: None  Delusions: No  Memory:Normal: Yes  Insight and Judgment: No  Insight and Judgment:  (IMPAIRED)  Present Suicidal Ideation: No  Present Homicidal Ideation: No    Tobacco Screening:  Practical Counseling, on admission, michoacano X, if applicable and completed (first 3 are required if patient doesn't refuse):            ( )  Recognizing danger situations (included triggers and roadblocks)                    ( )  Coping skills (new ways to manage stress, exercise, relaxation techniques, changing routine, distraction)                                                           ( )  Basic information about quitting (benefits of quitting, techniques in how to quit, available resources  ( ) Referral for counseling faxed to [facility]                                           ( ) Patient refused counseling  ( X) Patient has not smoked in the last 30 days    Metabolic Screening:    No results found for: LABA1C    No results for input(s): CHOL, TRIG, HDL, LDLCALC, LABVLDL in the last 72 hours. Body mass index is 22.46 kg/m². BP Readings from Last 2 Encounters:   10/03/18 108/72   09/13/18 (!) 157/80           Pt admitted with followings belongings:  Dentures: None  Vision - Corrective Lenses: None  Hearing Aid: None  Jewelry: None  Body Piercings Removed: N/A  Clothing: Footwear, Shirt, Sweater  Were All Patient Medications Collected?: Not Applicable  Other Valuables: Other (Comment) (HYGIENE ITEMS, BOOOKS)     Valuables to locker. Patient's home medications were not present. Patient oriented to surroundings and program expectations and copy of patient rights given. Received admission packet:  yes. Consents reviewed, signed involuntary rights. Refused :n/a.. Patient verbalize understanding:  yes.     Patient education on precautions:  Suicide, withdrawal.                   Patricia Solomon RN

## 2018-10-03 NOTE — H&P
PSYCHIATRIC EVALUATION  (HISTORY & PHYSICAL)     CHIEF COMPLAINT:   [x] Mood Problems [x] Anxiety Problems [] Psychosis                    [] Suicidal/Homicidal   [] Aggression  [] Other    HISTORY OF PRESENT ILLNESS: Natanael Hernandez  is a 34 y.o. female who has a previous psychiatric history of anxiety and presents for admission with SI . Symptoms onset was  Acute after relapsing on alcohol and is becoming severe for the last few days. This presentation associates with depression and usually is worsened by anxiety and financial stress.       Precipitating Factors:     [] Family Stress   [] Recent loss/grief Stress   [] Health Stress   [x] Relationship Stress    [] Legal Stress   [x] Environmental Stress    [] Occupational Stress   [] Financial Stress   [] Substance Abuse [] Other      PAST PSYCHIATRIC HISTORY:   History of psychiatric Hospitalization:    [x] Denies    [] yes  [] Days ago     []  Weeks Ago    [] Months ago  [] Years ago              [] [address]  [] [facility]  [] Other:        [] Once  [] More than once    Outpatient treatment:  [] [facility]  [] [facility]  [] [employer]              [] [employer]  [] [employer]      [] [address] [place] [address] [] [facility]      [] [facility] [] [facility]  [] [facility] [] [facility]             [] currently  [] in the past  [] Non-Compliant    [] Denies    Previous suicide attempt: []Denies            [] yes  [] OD  [] Cutting  [] Hanging  [] Gun  [] Other    Previous psych medications:  [] Was prescribed               [x] Currently Taking       [] Never taken medications      PAST MEDICAL HISTORY:       Diagnosis Date    Alcohol dependency ([facility]     Anxiety and depression     Psychiatric problem        FAMILY PSYCHIATRIC HISTORY:  Family History   Problem Relation Age of Onset    Depression Mother     Anxiety Disorder Mother     Depression Maternal Aunt     Anxiety Disorder Maternal Aunt            [] Denies       [] Endorses               [x] Father                [] negative. Constitutional Symptoms: []  fever []  Chills  Skin Symptoms: [] rash []  Pruritus   Eye Symptoms: [] Vision unchanged []  recent vision problems[] blurred vision   Respiratory Symptoms:[] shortness of breath [] cough  Cardiovascular Symptoms:  [] chest pain   [] palpitations   Gastrointestinal Symptoms: []  abdominal pain []  nausea []  vomiting []  diarrhea  Genitourinary Symptoms: []  dysuria  []  hematuria   Musculoskeletal Symptoms: []  back pain []  muscle pain []  joint pain  Neurologic Symptoms: []  headache []  dizziness  Hematolymphoid Symptoms: [] Adenopathy [] Bruises   [] Schimosis       VITALS: /72   Pulse 83   Temp 98.6 °F (37 °C) (Oral)   Resp 16   Ht 5' (1.524 m)   Wt 115 lb (52.2 kg)   LMP  (LMP Unknown)   SpO2 98%   BMI 22.46 kg/m²     ALLERGIES: Patient has no known allergies.             Physical Examination:    Head:  [x] Atraumatic:  [x] normocephalic  Skin and Mucosa       [] Moist [] Dry [] Pale [x] Normal   Neck: [x] Thyroid [] Palpable    [x] Not palpable []  venus distention [] adenopathy   Chest: [x] Clear [] Rhonchi  [] Wheezing   CV: [x] S1 [x] S2 [] No murmer   Abdomen:  [x] Soft   [] Tender  [] Viceromegaly   Extremities:  [x] No Edema   [] Edema    Cranial Nerves Examination:    CN II: [x] Pupils are reactive to light [] Pupils are non reactive to light  CN III, IV, VI:[x] No eye deviation  [] No diplopia or ptosis   CN V: [x] Facial Sensation is intact  [] Facial Sensation is not intact   CN IIIV:  [x] Hearing is normal to rubbing fingers   CN IX, X:  [x] Normal gag reflex and phonation   CN XI: [x] Shoulder shrug and neck rotation is normal  CNXII: [x] Tongue is midline no deviation or atrophy       For further PE refer to ED note    MENTAL STATUS EXAM:       Mental Status Examination:    Cognition:      [x] Alert  [x] Awake  [x] Oriented  [x] Person  [x] Place [x] Time      [] drowsy  [] tired  [] lethargic  [] distractable  [] Attention/Concentration:   [] Attentive  [] Distracted        Memory Recent and Remote: [] Intact   [] Impaired [] Partially Impaired     Language: [] Able to recognize and name objects          [] Unable to recognize and name Objects    Fund of Knowledge:  [] Poor []  Fair  [] Good    Speech: [] Normal  [x] Soft  [] Slow  [] Fast [] Pressured            [] Loud [] Dysarthria  [] Incoherent       Appearance: [] Well Groomed  [] Casual Dressed  [x] Unkept  [] Disheveled          [] Normal weight[] Thin  [] Overweight  [] Obese           Attitude: [] Positive  [] Hostile  [] Demanding  [] Guarded  [] Defensive         [x] Cooperative  []  Uncooperative      Behavior:  [x] Normal Gait  [] Walks with Assistance  [] Gadiel Cables    [] Walks with [place] A[employer]  [] In Hospital Bed  [] Sitting in Chair    Muscle-Skeletal:  [x] Normal Muscle Tone [] Muscle Atrophy       [] Abnormal Muscle Movement     Eye Contact:  [x] Good eye contact  [] Intermittent Eye Contact  [] Poor Eye Contact     Mood: [x] Depressed  [x] Anxious  [] Irritated  [] Euthymic   [] Angry [] Restless    Affect:  [x] Congruent  [] Incongruent  [] Labile  [] Constricted  [] Flat  [] Bizarre     Thought Process and Association:  [] Logical [] Illogical       [x] Linear and Goal Directed  [] Tangential  [] Circumstantial     Thought Content:  [] Denies [] Endorses [x] Suicidal [] Homicidal  [] Delusional      [] Paranoid  [] Somatic  [] Grandiose    Perception: [x]  None  [] Auditory   [] Visual  [] tactile   [] olfactory  [] Illusions         Insight: [] Intact  [] Fair  [x] Limited    Judgement:  [] Intact  [] Fair  [x] Limited        ASSESSMENT  Patient Active Problem List   Diagnosis    [facility] disorder, [facility]    Alcohol dependence with uncomplicated withdrawal ([facility]    Major depressive episode    MDD (major depressive disorder), recurrent severe, without psychosis ([facility]     Recommendations and plan of

## 2018-10-03 NOTE — PLAN OF CARE
Problem: Depressive Behavior With or Without Suicide Precautions:  Goal: Ability to disclose and discuss suicidal ideas will improve  Ability to disclose and discuss suicidal ideas will improve   Outcome: Met This Shift  PT. DENIES SUICIDAL IDEATION. Problem: Substance Abuse:  Goal: Absence of drug withdrawal signs and symptoms  Absence of drug withdrawal signs and symptoms   Outcome: Ongoing  PT. DENIED WITHDRAWAL SYMPTOMS THIS A. M..

## 2018-10-04 LAB
CHOLESTEROL, TOTAL: 142 MG/DL (ref 0–199)
HBA1C MFR BLD: 5 % (ref 4–5.6)
HDLC SERPL-MCNC: 72 MG/DL
LDL CHOLESTEROL CALCULATED: 62 MG/DL (ref 0–99)
TRIGL SERPL-MCNC: 42 MG/DL (ref 0–149)
VLDLC SERPL CALC-MCNC: 8 MG/DL

## 2018-10-04 PROCEDURE — 83036 HEMOGLOBIN GLYCOSYLATED A1C: CPT

## 2018-10-04 PROCEDURE — 36415 COLL VENOUS BLD VENIPUNCTURE: CPT

## 2018-10-04 PROCEDURE — 99231 SBSQ HOSP IP/OBS SF/LOW 25: CPT | Performed by: PSYCHIATRY & NEUROLOGY

## 2018-10-04 PROCEDURE — 80061 LIPID PANEL: CPT

## 2018-10-04 PROCEDURE — 6370000000 HC RX 637 (ALT 250 FOR IP): Performed by: NURSE PRACTITIONER

## 2018-10-04 PROCEDURE — 6370000000 HC RX 637 (ALT 250 FOR IP): Performed by: PSYCHIATRY & NEUROLOGY

## 2018-10-04 PROCEDURE — 1240000000 HC EMOTIONAL WELLNESS R&B

## 2018-10-04 RX ORDER — HYDROXYZINE PAMOATE 50 MG/1
50 CAPSULE ORAL EVERY 6 HOURS PRN
Status: DISCONTINUED | OUTPATIENT
Start: 2018-10-04 | End: 2018-10-05 | Stop reason: HOSPADM

## 2018-10-04 RX ORDER — HALOPERIDOL 5 MG/ML
10 INJECTION INTRAMUSCULAR EVERY 6 HOURS PRN
Status: DISCONTINUED | OUTPATIENT
Start: 2018-10-04 | End: 2018-10-05 | Stop reason: HOSPADM

## 2018-10-04 RX ORDER — BENZTROPINE MESYLATE 1 MG/ML
2 INJECTION INTRAMUSCULAR; INTRAVENOUS 2 TIMES DAILY PRN
Status: DISCONTINUED | OUTPATIENT
Start: 2018-10-04 | End: 2018-10-05 | Stop reason: HOSPADM

## 2018-10-04 RX ORDER — ACETAMINOPHEN 325 MG/1
650 TABLET ORAL EVERY 4 HOURS PRN
Status: DISCONTINUED | OUTPATIENT
Start: 2018-10-04 | End: 2018-10-05 | Stop reason: HOSPADM

## 2018-10-04 RX ORDER — MAGNESIUM HYDROXIDE/ALUMINUM HYDROXICE/SIMETHICONE 120; 1200; 1200 MG/30ML; MG/30ML; MG/30ML
30 SUSPENSION ORAL PRN
Status: DISCONTINUED | OUTPATIENT
Start: 2018-10-04 | End: 2018-10-05 | Stop reason: HOSPADM

## 2018-10-04 RX ORDER — OLANZAPINE 10 MG/1
10 TABLET ORAL
Status: ACTIVE | OUTPATIENT
Start: 2018-10-04 | End: 2018-10-04

## 2018-10-04 RX ORDER — TRAZODONE HYDROCHLORIDE 50 MG/1
50 TABLET ORAL NIGHTLY PRN
Status: DISCONTINUED | OUTPATIENT
Start: 2018-10-04 | End: 2018-10-05 | Stop reason: HOSPADM

## 2018-10-04 RX ADMIN — PERPHENAZINE 2 MG: 2 TABLET, FILM COATED ORAL at 17:04

## 2018-10-04 RX ADMIN — SERTRALINE 25 MG: 50 TABLET, FILM COATED ORAL at 09:16

## 2018-10-04 RX ADMIN — SERTRALINE 25 MG: 50 TABLET, FILM COATED ORAL at 20:36

## 2018-10-04 RX ADMIN — PERPHENAZINE 2 MG: 2 TABLET, FILM COATED ORAL at 09:16

## 2018-10-04 RX ADMIN — NALTREXONE HYDROCHLORIDE 50 MG: 50 TABLET, FILM COATED ORAL at 20:36

## 2018-10-04 RX ADMIN — TRAZODONE HYDROCHLORIDE 50 MG: 50 TABLET ORAL at 21:28

## 2018-10-04 RX ADMIN — PROPRANOLOL HYDROCHLORIDE 20 MG: 20 TABLET ORAL at 09:16

## 2018-10-04 ASSESSMENT — PAIN SCALES - GENERAL: PAINLEVEL_OUTOF10: 0

## 2018-10-04 NOTE — PLAN OF CARE
Problem: Depressive Behavior With or Without Suicide Precautions:  Goal: Able to verbalize and/or display a decrease in depressive symptoms  Able to verbalize and/or display a decrease in depressive symptoms   Outcome: Ongoing    Goal: Ability to disclose and discuss suicidal ideas will improve  Ability to disclose and discuss suicidal ideas will improve   Outcome: Met This Shift      Comments: Pt is out on the unit sitting alone. On approach she is pleasant but affect is sad. States that she feels \"just ok\" regarding her mental state. Does admit to feeling a little down when she is alone with her thoughts and is thinking about everything that has gone on recently. Denies any suicidal ideations. Also denies any withdrawal symptoms from ETOH.

## 2018-10-04 NOTE — PROGRESS NOTES
Group Therapy Note     Date: 10/4/2018  Start Time: 1100  End Time:  8017  Number of Participants:11     Type of Group: Psychotherapy     Wellness Binder Information  Module Name:  n/a  Session Number:  n/a     Patient's Goal:  To increase socialization and improve communication with others.     Notes:  Pt was active in group discussion focusing on importance of communication and ways to improve communication with others.      Status After Intervention:  Improved     Participation Level:  Active Listener and Interactive     Participation Quality: Appropriate, Attentive, Sharing and Supportive     Speech:  normal     Thought Process/Content: Logical     Affective Functioning: Blunted     Mood: Euthymic     Level of consciousness:  Alert, Oriented x4 and Attentive     Response to Learning: Able to verbalize current knowledge/experience, Able to verbalize/acknowledge new learning and Progressing to goal     Endings: None Reported     Modes of Intervention: Support, Socialization, Exploration and Problem-solving     Discipline Responsible: /Counselor

## 2018-10-05 VITALS
OXYGEN SATURATION: 98 % | DIASTOLIC BLOOD PRESSURE: 74 MMHG | BODY MASS INDEX: 22.58 KG/M2 | HEIGHT: 60 IN | RESPIRATION RATE: 16 BRPM | TEMPERATURE: 98.7 F | HEART RATE: 68 BPM | WEIGHT: 115 LBS | SYSTOLIC BLOOD PRESSURE: 112 MMHG

## 2018-10-05 PROCEDURE — 6370000000 HC RX 637 (ALT 250 FOR IP): Performed by: PSYCHIATRY & NEUROLOGY

## 2018-10-05 PROCEDURE — 99238 HOSP IP/OBS DSCHRG MGMT 30/<: CPT | Performed by: PSYCHIATRY & NEUROLOGY

## 2018-10-05 RX ORDER — PERPHENAZINE 2 MG/1
4 TABLET, FILM COATED ORAL 2 TIMES DAILY WITH MEALS
Qty: 60 TABLET | Refills: 0 | Status: SHIPPED | OUTPATIENT
Start: 2018-10-05

## 2018-10-05 RX ORDER — SERTRALINE HYDROCHLORIDE 25 MG/1
25 TABLET, FILM COATED ORAL 2 TIMES DAILY
Qty: 30 TABLET | Refills: 0 | Status: SHIPPED | OUTPATIENT
Start: 2018-10-05

## 2018-10-05 RX ORDER — NALTREXONE HYDROCHLORIDE 50 MG/1
50 TABLET, FILM COATED ORAL NIGHTLY
Qty: 30 TABLET | Refills: 0 | Status: SHIPPED | OUTPATIENT
Start: 2018-10-05

## 2018-10-05 RX ADMIN — SERTRALINE 25 MG: 50 TABLET, FILM COATED ORAL at 09:29

## 2018-10-05 RX ADMIN — PERPHENAZINE 2 MG: 2 TABLET, FILM COATED ORAL at 09:30

## 2018-10-05 RX ADMIN — PERPHENAZINE 2 MG: 2 TABLET, FILM COATED ORAL at 17:05

## 2018-10-05 RX ADMIN — PROPRANOLOL HYDROCHLORIDE 20 MG: 20 TABLET ORAL at 09:29

## 2018-10-05 ASSESSMENT — PAIN SCALES - GENERAL: PAINLEVEL_OUTOF10: 0

## 2018-10-05 NOTE — PROGRESS NOTES
[facility]  Discharge Note    Pt discharged with followings belongings:   Dentures: None  Vision - Corrective Lenses: None  Hearing Aid: None  Jewelry: None  Body Piercings Removed: N/A  Clothing: Footwear, Shirt, Sweater  Were All Patient Medications Collected?: Not Applicable  Other Valuables: Other (Comment) (HYGIENE ITEMS, BOOOKS)   Valuables sent home with pt. Patient left department with Departure Mode: In police custody via Mobility at Departure: Ambulatory, discharged to Discharged to: [employer] Patient education on aftercare instructions: yes   Patient verbalize understanding of AVS:  yes.     Status EXAM upon discharge:  Status and Exam  Normal: No  Facial Expression: Worried  Affect: Congruent  Level of Consciousness: Alert  Mood:Normal: No  Mood: Anxious  Motor Activity:Normal: Yes  Interview Behavior: Cooperative  Preception: [place] to Person, [place] Redo to Time, [place] to Place, [place] to Situation  Attention:Normal: Yes  Thought Processes: Other(See comment) (organized)  Thought Content:Normal: Yes  Hallucinations: None  Delusions: No  Memory:Normal: Yes  Insight and Judgment: Yes  Insight and Judgment: Poor Insight  Present Suicidal Ideation: No  Present Homicidal Ideation: No    Hernan Posey RN

## 2018-10-05 NOTE — PROGRESS NOTES
CLINICAL PHARMACY NOTE: MEDS TO [address] Select Patient?: Yes  Total # of Prescriptions Filled: 3   The following medications were delivered to the patient:  · Perphenazine 2mg  · Naltrexone 50mg  · Zoloft 25mg  Total # of Interventions Completed: 3  Time Spent (min): 15    Additional Documentation: floor gave voucher for copay.

## 2018-10-05 NOTE — DISCHARGE SUMMARY
Prescriptions: Continue same medications, review with patient.        Reason for more than one antipsychotic:  [x] N/A  [] 3 failed monotherapy(drugs tried):  [] Cross over to a new antipsychotic  [] Taper to monotherapy from polypharmacy  [] Augmentation of Clozapine therapy due to treatment resistance to single therapy      Diagnosis:        Patient Active Problem List   Diagnosis Code    MDD (major depressive disorder), recurrent severe, without psychosis [facility] F33.2       Education and Follow-up:  Counseled:  [x] Patient     [] Family    [] Guardian      Cornelia Shi   10/5/2018   10:05 AM

## 2018-10-08 LAB
AMOBARBITAL URINE QUANT: <50 NG/ML
BUTALBITAL URINE QUANT: <50 NG/ML
PENTOBARBITAL URINE QUANT: <50 NG/ML
PHENOBARBITAL URINE QUANT: 450 NG/ML
SECOBARBITAL URINE QUANT: <50 NG/ML

## 2018-10-08 NOTE — CARE COORDINATION
VERITO Note: SW attempted to contact pt for post follow up phone call. SW left message for pt to call if any needs arise.

## 2023-01-18 ENCOUNTER — ANCILLARY PROCEDURE (OUTPATIENT)
Dept: OBGYN CLINIC | Age: 38
End: 2023-01-18
Payer: COMMERCIAL

## 2023-01-18 ENCOUNTER — HOSPITAL ENCOUNTER (OUTPATIENT)
Age: 38
Setting detail: OBSERVATION
Discharge: HOME | End: 2023-01-18
Attending: OBSTETRICS & GYNECOLOGY | Admitting: OBSTETRICS & GYNECOLOGY
Payer: COMMERCIAL

## 2023-01-18 VITALS — SYSTOLIC BLOOD PRESSURE: 140 MMHG | HEART RATE: 74 BPM | TEMPERATURE: 98 F | DIASTOLIC BLOOD PRESSURE: 88 MMHG

## 2023-01-18 PROBLEM — O09.513 AMA (ADVANCED MATERNAL AGE) PRIMIGRAVIDA 35+, THIRD TRIMESTER: Status: ACTIVE | Noted: 2023-01-18

## 2023-01-18 PROBLEM — O16.3 ELEVATED BLOOD PRESSURE AFFECTING PREGNANCY IN THIRD TRIMESTER, ANTEPARTUM: Status: ACTIVE | Noted: 2023-01-18

## 2023-01-18 PROBLEM — O13.9 GESTATIONAL HYPERTENSION AFFECTING THIRD PREGNANCY: Status: ACTIVE | Noted: 2023-01-18

## 2023-01-18 LAB
ALBUMIN SERPL-MCNC: 3.5 G/DL (ref 3.5–5.2)
ALP BLD-CCNC: 77 U/L (ref 35–104)
ALT SERPL-CCNC: 11 U/L (ref 0–32)
ANION GAP SERPL CALCULATED.3IONS-SCNC: 10 MMOL/L (ref 7–16)
APTT: 24.9 SEC (ref 24.5–35.1)
AST SERPL-CCNC: 14 U/L (ref 0–31)
BACTERIA: NORMAL /HPF
BILIRUB SERPL-MCNC: 0.3 MG/DL (ref 0–1.2)
BILIRUBIN URINE: NEGATIVE
BLOOD, URINE: NEGATIVE
BUN BLDV-MCNC: 7 MG/DL (ref 6–20)
CALCIUM SERPL-MCNC: 8.7 MG/DL (ref 8.6–10.2)
CHLORIDE BLD-SCNC: 107 MMOL/L (ref 98–107)
CLARITY: CLEAR
CO2: 19 MMOL/L (ref 22–29)
COLOR: YELLOW
CREAT SERPL-MCNC: 0.4 MG/DL (ref 0.5–1)
CREATININE URINE: 18 MG/DL (ref 29–226)
D DIMER: 1019 NG/ML DDU
FIBRINOGEN: 407 MG/DL (ref 200–400)
GFR SERPL CREATININE-BSD FRML MDRD: >60 ML/MIN/1.73
GLUCOSE BLD-MCNC: 81 MG/DL (ref 74–99)
GLUCOSE URINE: NEGATIVE MG/DL
HCT VFR BLD CALC: 36.1 % (ref 34–48)
HEMOGLOBIN: 12.2 G/DL (ref 11.5–15.5)
INR BLD: 0.9
KETONES, URINE: NEGATIVE MG/DL
LEUKOCYTE ESTERASE, URINE: ABNORMAL
MCH RBC QN AUTO: 31.4 PG (ref 26–35)
MCHC RBC AUTO-ENTMCNC: 33.8 % (ref 32–34.5)
MCV RBC AUTO: 93 FL (ref 80–99.9)
NITRITE, URINE: NEGATIVE
PDW BLD-RTO: 12.8 FL (ref 11.5–15)
PH UA: 7 (ref 5–9)
PLATELET # BLD: 220 E9/L (ref 130–450)
PMV BLD AUTO: 9.2 FL (ref 7–12)
POTASSIUM SERPL-SCNC: 4.1 MMOL/L (ref 3.5–5)
PROTEIN PROTEIN: <4 MG/DL (ref 0–12)
PROTEIN UA: NEGATIVE MG/DL
PROTEIN/CREAT RATIO: 0.2
PROTEIN/CREAT RATIO: 0.2 (ref 0–0.2)
PROTHROMBIN TIME: 9.5 SEC (ref 9.3–12.4)
RBC # BLD: 3.88 E12/L (ref 3.5–5.5)
RBC UA: NORMAL /HPF (ref 0–2)
SODIUM BLD-SCNC: 136 MMOL/L (ref 132–146)
SPECIFIC GRAVITY UA: <=1.005 (ref 1–1.03)
TOTAL PROTEIN: 6.1 G/DL (ref 6.4–8.3)
URIC ACID, SERUM: 3.3 MG/DL (ref 2.4–5.7)
UROBILINOGEN, URINE: 0.2 E.U./DL
WBC # BLD: 8.5 E9/L (ref 4.5–11.5)
WBC UA: NORMAL /HPF (ref 0–5)

## 2023-01-18 PROCEDURE — 85378 FIBRIN DEGRADE SEMIQUANT: CPT

## 2023-01-18 PROCEDURE — 99212 OFFICE O/P EST SF 10 MIN: CPT

## 2023-01-18 PROCEDURE — 85610 PROTHROMBIN TIME: CPT

## 2023-01-18 PROCEDURE — 82570 ASSAY OF URINE CREATININE: CPT

## 2023-01-18 PROCEDURE — 76819 FETAL BIOPHYS PROFIL W/O NST: CPT | Performed by: OBSTETRICS & GYNECOLOGY

## 2023-01-18 PROCEDURE — 85027 COMPLETE CBC AUTOMATED: CPT

## 2023-01-18 PROCEDURE — 84156 ASSAY OF PROTEIN URINE: CPT

## 2023-01-18 PROCEDURE — G0378 HOSPITAL OBSERVATION PER HR: HCPCS

## 2023-01-18 PROCEDURE — 84550 ASSAY OF BLOOD/URIC ACID: CPT

## 2023-01-18 PROCEDURE — 76820 UMBILICAL ARTERY ECHO: CPT | Performed by: OBSTETRICS & GYNECOLOGY

## 2023-01-18 PROCEDURE — 85384 FIBRINOGEN ACTIVITY: CPT

## 2023-01-18 PROCEDURE — 76805 OB US >/= 14 WKS SNGL FETUS: CPT | Performed by: OBSTETRICS & GYNECOLOGY

## 2023-01-18 PROCEDURE — 80053 COMPREHEN METABOLIC PANEL: CPT

## 2023-01-18 PROCEDURE — 85730 THROMBOPLASTIN TIME PARTIAL: CPT

## 2023-01-18 PROCEDURE — 81001 URINALYSIS AUTO W/SCOPE: CPT

## 2023-01-18 RX ORDER — NIFEDIPINE 30 MG/1
30 TABLET, EXTENDED RELEASE ORAL DAILY
Qty: 90 TABLET | Refills: 1 | Status: SHIPPED | OUTPATIENT
Start: 2023-01-18

## 2023-01-18 NOTE — H&P
Department of Obstetrics and Gynecology  Labor and Delivery  History & Physical    Patient:  Bebeto Ibrahim     Admit Date:  2023  8:25 AM  Medical Record Number:  [id number]    CHIEF COMPLAINT:  elevated blood pressure    PROBLEM LIST:     Patient Active Problem List   Diagnosis    MDD (major depressive disorder), recurrent severe, without psychosis ([facility]    Gestational hypertension affecting third pregnancy           HISTORY OF PRESENT ILLNESS:    The patient is a 30 y.o.  female  at Unknown. Patient presents with a chief complaint as above and is being evaluated for pregnancy-induced hypertension. Pt states she has a hx of white-coat syndrome. She was treated prior to pregnancy with propranolol, pt states states she stopped the medication herself. She denies headache, blurred vision, nausea/vomting, swelling or epigastric pain. ESTIMATED DUE DATE: Estimated Date of Delivery: None noted. PRENATAL CARE:  Complicated by: gestational hypertension       Past OB History  OB History          2    Para        Term                AB        Living             SAB        IAB        Ectopic        Molar        Multiple        Live Births                    Past Medical History:        Diagnosis Date    Alcohol dependency ([facility]     Anxiety and depression     Psychiatric problem        Past Surgical History:    History reviewed. No pertinent surgical history. Allergies:  Patient has no known allergies.     Social History:    Social History     Socioeconomic History    Marital status:      Spouse name: Not on file    Number of children: 0    Years of education: Not on file    Highest education level: Not on file   Occupational History    Occupation: physician assistant   Tobacco Use    Smoking status: Never    Smokeless tobacco: Never   Vaping Use    Vaping Use: Never used   Substance and Sexual Activity    Alcohol use: Yes     Comment: liter of vodka daily    Drug use: No    Sexual activity: Yes     Partners: Male   Other Topics Concern    Not on file   Social History Narrative    Not on file     Social Determinants of Health     Financial Resource Strain: Not on file   Food Insecurity: Not on file   Transportation Needs: Not on file   Physical Activity: Not on file   Stress: Not on file   Social Connections: Not on file   Intimate Partner Violence: Not on file   Housing Stability: Not on file       Family History:       Problem Relation Age of Onset    Depression Mother     Anxiety Disorder Mother     Depression Maternal Aunt     Anxiety Disorder Maternal Aunt        Medications Prior to Admission:  Medications Prior to Admission: Prenatal MV-Min-Fe Fum-FA-DHA (PRENATAL 1 PO), Take by mouth  sertraline (ZOLOFT) 25 MG tablet, Take 1 tablet by mouth 2 times daily (Patient not taking: Reported on 1/18/2023)  naltrexone (DEPADE) 50 MG tablet, Take 1 tablet by mouth nightly (Patient not taking: Reported on 1/18/2023)  perphenazine 2 MG tablet, Take 2 tablets by mouth 2 times daily (with meals) (Patient not taking: Reported on 1/18/2023)  propranolol (INDERAL) 40 MG tablet, Take 20 mg by mouth 2 times daily  (Patient not taking: Reported on 1/18/2023)    REVIEW OF SYSTEMS:  CONSTITUTIONAL:  negative  RESPIRATORY:  negative  CARDIOVASCULAR:  negative  GASTROINTESTINAL:  negative  ALLERGIC/IMMUNOLOGIC:  negative  NEUROLOGICAL:  negative  BEHAVIOR/PSYCH:  negative    PHYSICAL EXAM:  Vitals:    01/18/23 0918 01/18/23 0924   BP: (!) 180/100 (!) 160/104   Pulse: 69 88     General appearance:  awake, alert, cooperative, no apparent distress, and appears stated age  Skin: warm, dry, normal color, no rash  Heart:  Regular rate & rhythm, S1 S2, no murmurs, rubs, or gallops  Lungs:  No increased work of breathing, good air exchange, CTA b/l. Abdomen:  Soft, non tender, gravid, consistent with her gestational age   Extremities: No clubbing, cyanosis, cords;  No calf tenderness; Edema: 1+  Fetal heart rate: Reassuring. Pelvis:  Adequate pelvis  Cervix: deferred  Contraction frequency:  0 minutes  Membranes:  Intact    Labs:   No results found for this or any previous visit (from the past 72 hour(s)).     ASSESSMENT:   30 y.o.  female at Unknown   gestational hypertension  Patient Active Problem List   Diagnosis    MDD (major depressive disorder), recurrent severe, without psychosis ([facility]    Gestational hypertension affecting third pregnancy     Fetus: Reassuring  GBS: not done    PLAN:  Observe  Cbc  Cmp  Uric acid  Urinalysis  Urine pr/cr ratio  Cycle bps  Fetal monitoring    CHIRAG Jones CNP  2023 9:27 AM

## 2023-01-18 NOTE — PROGRESS NOTES
Dr notified of bps  Labs still pending. Will update when all are back. No new orders at this time.    CHIRAG Sandoval - CNP

## 2023-01-18 NOTE — CONSULTS
Poncho Ann MD  [address]     RE:  Mercedes Helton  : 1985   AGE: 30 y.o. This report has been created using voice recognition software. It may contain errors which are inherent in voice recognition technology. Dear Dr. Howard Do:    Jessie Merchant had a consultation today for the following indications:    Patient Active Problem List   Diagnosis    MDD (major depressive disorder), recurrent severe, without psychosis ([facility]    Gestational hypertension affecting third pregnancy    AMA (advanced maternal age) primigravida 35+, third trimester    Elevated blood pressure affecting pregnancy in third trimester, antepartum     Jessie Merchant is a 30 y.o. female, who is G1(0). She has an Estimated Date of Delivery: 3/8/23 based on her established dates. She is currently 33 weeks 0 days gestation based on that assessment. The patient was admitted for evaluation and management secondary to elevated blood pressures in Dr. Chicho Monreal office. She states that her blood pressures have been elevated intermittently throughout her pregnancy. She takes no antihypertensive medication. The patient states that she has whitecoat syndrome. She checks her blood pressures at home and states that they have been within normal limits. She had no symptomatology related to hypertension. She stated that on admission she was quite upset and crying. Her blood pressure was 180/100. Her blood pressure normalized when her support person arrived, and has been within normal limits since that time. The fetal heart tracing is reassuring with moderate variability and accelerations. Occasional areas of discontinuity in the tracing are noted related to fetal and maternal movement. A fetal ultrasound evaluation was performed and a detailed report included in the EMR under the imaging tab.   A living mayo intrauterine fetus was identified in the cephalic presentation, with normal fetal heart motion and normal fetal motion noted. The amniotic fluid index was 12.4 cm. The placenta was anterior. The estimated fetal weight was at the 48th growth percentile for gestational age. No apparent gross fetal anatomic abnormalities were identified nearly visualized, however, visualization of the fetal anatomy was somewhat limited secondary to the advanced gestational age of the fetus in the fetal position. The biophysical profile and cord Doppler studies were both reassuring. There was no evidence of absence, or reversal of end-diastolic flow in the samples. GENETIC SCREENING/TERATOLOGY COUNSELING              (Includes patient, FTB, and any affected family members)    Patient Age > 35 Years YES   Thalassemia ( MVC<80) NO   Congential Heart Defect NO   Neural Tube Defect NO   Eleno-Sachs NO   Sickle Cell Disease NO   Sickle Cell Trait NO   Sickle C Disease or Trait NO   Hemophilia NO   Muscular Dystrophy NO   Cystic Fibrosis NO   [place] Disease NO   Autism NO   Mental Retardation NO   History of Fragile X NO   Maternal Diabetes NO   Other Genetic Disease or Syndrome NO   Previous Child With Congenital Abnormality Not Listed NO   Recreational Drugs NO                                                                 INFECTION HISTORY     HEPATITIS IMMUNIZED:  YES   HEPATITIS INFECTION:  NO   EXPOSURE TO TB NO   GENITAL HERPES NO   PARVOVIRUS B-19 NO   CHICKEN POX  NO   MEASLES NO   HIV NO     OB History    Para Term  AB Living   2             SAB IAB Ectopic Molar Multiple Live Births                    # Outcome Date GA Lbr Benjamin/2nd Weight Sex Delivery Anes PTL Lv   2 Current            1                 PAST GYNECOLOGICAL  HISTORY:  Negative for abnormal pap smears. Negative for sexually transmitted diseases. Negative for cervical LEEP / conization /cryosurgery. Negative for uterine surgery. Negative for ovarian or tubal surgery.      Past Medical History: Diagnosis Date    Alcohol dependency ([facility]     Anxiety and depression     Psychiatric problem        History reviewed. No pertinent surgical history. No Known Allergies    No current facility-administered medications for this encounter. Social History     Tobacco Use    Smoking status: Never    Smokeless tobacco: Never   Substance Use Topics    Alcohol use: Yes     Comment: liter of vodka daily     FAMILY MEDICAL HISTORY:   Negative for congenital abnormalities, autism, genetic disease and mental retardation, not listed above. Review of Systems :   CONSTITUTIONAL : No fever, no chills   HEENT : No headache, no visual changes, no rhinorrhea, no sore throat   CARDIOVASCULAR : No pain, no palpitations, no edema   RESPIRATORY : No pain, no shortness of breath   GASTROINTESTINAL : No N/V, no D/C, no abdominal pain   GENITOURINARY : No dysuria, hematuria and no incontinence   MUSCULOSKELETAL : No myalgia, No back pain  NEUROLOGICAL : No numbness, no tingling, no tremors. No history of seizures  ALL OTHER SYSTEMS WERE REPORTED AS NEGATIVE.     PERTINENT PHYSICAL EXAMINATION:   Patient Vitals for the past 24 hrs:   BP Temp Temp src Pulse   01/18/23 1708 112/69 -- -- 64   01/18/23 1608 130/88 -- -- 68   01/18/23 1355 107/70 -- -- 71   01/18/23 1322 115/71 -- -- 78   01/18/23 1307 108/62 -- -- 76   01/18/23 1252 103/62 -- -- 75   01/18/23 1237 106/71 -- -- 75   01/18/23 1222 106/70 -- -- 68   01/18/23 1221 106/70 -- -- 68   01/18/23 1207 107/72 -- -- 65   01/18/23 1152 117/78 -- -- 64   01/18/23 1137 115/79 -- -- 72   01/18/23 1123 117/78 -- -- 68   01/18/23 1052 131/84 -- -- 74   01/18/23 1037 135/86 -- -- 68   01/18/23 1022 (!) 144/94 -- -- 66   01/18/23 1007 (!) 145/88 -- -- 77   01/18/23 0952 (!) 154/98 -- -- 72   01/18/23 0951 (!) 156/98 -- -- 76   01/18/23 0937 (!) 174/91 98 °F (36.7 °C) Oral 71   01/18/23 0924 (!) 160/104 -- -- 88   01/18/23 0918 (!) 180/100 -- -- 69     GENERAL:   The patient is a well developed, female who is alert cooperative and oriented times three in no acute distress. HEENT:  Normo cephalic and atraumatic. No facial edema. LUNGS:  Clear to A&P    HEART:  RRR with out clement. There is a 2/6 MSEM radiating the outflow tract. ABDOMEN:   Her uterus is gravid. She had no complaint of abdominal pain or tenderness. The fetal heart rate is 139 bpm. The fetus is in the cephalic presentation which was confirmed by the ultrasound assessment. EXTREMITIES:  No peripheral edema is noted. NEUROLOGICAL ASSESSMENT:  Reflexes are 2+/4+ and equal bilaterally. PELVIC EXAMINATION:  Not repeated at this time since the patient was not having contraction activity or abdominal discomfort.     Admission on 01/18/2023   Component Date Value Ref Range Status    WBC 01/18/2023 8.5  4.5 - 11.5 E9/L Final    RBC 01/18/2023 3.88  3.50 - 5.50 E12/L Final    Hemoglobin 01/18/2023 12.2  11.5 - 15.5 g/dL Final    Hematocrit 01/18/2023 36.1  34.0 - 48.0 % Final    MCV 01/18/2023 93.0  80.0 - 99.9 fL Final    MCH 01/18/2023 31.4  26.0 - 35.0 pg Final    MCHC 01/18/2023 33.8  32.0 - 34.5 % Final    RDW 01/18/2023 12.8  11.5 - 15.0 fL Final    Platelets 87/11/7774 220  130 - 450 E9/L Final    MPV 01/18/2023 9.2  7.0 - 12.0 fL Final    Color, UA 01/18/2023 Yellow  Straw/Yellow Final    Clarity, UA 01/18/2023 Clear  Clear Final    Glucose, Ur 01/18/2023 Negative  Negative mg/dL Final    Bilirubin Urine 01/18/2023 Negative  Negative Final    Ketones, Urine 01/18/2023 Negative  Negative mg/dL Final    Specific Gravity, UA 01/18/2023 <=1.005  1.005 - 1.030 Final    Blood, Urine 01/18/2023 Negative  Negative Final    pH, UA 01/18/2023 7.0  5.0 - 9.0 Final    Protein, UA 01/18/2023 Negative  Negative mg/dL Final    Urobilinogen, Urine 01/18/2023 0.2  <2.0 E.U./dL Final    Nitrite, Urine 01/18/2023 Negative  Negative Final    Leukocyte Esterase, Urine 01/18/2023 TRACE (A)  Negative Final    Protein, Ur 01/18/2023 <4  0 - 12 mg/dL Final    Creatinine, Ur 01/18/2023 18 (A)  29 - 226 mg/dL Final    Protein/Creat Ratio 01/18/2023 0.2  0.0 - 0.2 Final    Protein/Creat Ratio 01/18/2023 0.2   Final    aPTT 01/18/2023 24.9  24.5 - 35.1 sec Final    Protime 01/18/2023 9.5  9.3 - 12.4 sec Final    INR 01/18/2023 0.9   Final    Fibrinogen 01/18/2023 407 (A)  200 - 400 mg/dL Final    D-Dimer, Quant 01/18/2023 1019  ng/mL DDU Final    WBC, UA 01/18/2023 NONE  0 - 5 /HPF Final    RBC, UA 01/18/2023 NONE  0 - 2 /HPF Final    Bacteria, UA 01/18/2023 NONE SEEN  None Seen /HPF Final    Sodium 01/18/2023 136  132 - 146 mmol/L Final    Potassium 01/18/2023 4.1  3.5 - 5.0 mmol/L Final    Chloride 01/18/2023 107  98 - 107 mmol/L Final    CO2 01/18/2023 19 (A)  22 - 29 mmol/L Final    Anion Gap 01/18/2023 10  7 - 16 mmol/L Final    Glucose 01/18/2023 81  74 - 99 mg/dL Final    BUN 01/18/2023 7  6 - 20 mg/dL Final    Creatinine 01/18/2023 0.4 (A)  0.5 - 1.0 mg/dL Final    Est, Glom Filt Rate 01/18/2023 >60  >=60 mL/min/1.73 Final    Calcium 01/18/2023 8.7  8.6 - 10.2 mg/dL Final    Total Protein 01/18/2023 6.1 (A)  6.4 - 8.3 g/dL Final    Albumin 01/18/2023 3.5  3.5 - 5.2 g/dL Final    Total Bilirubin 01/18/2023 0.3  0.0 - 1.2 mg/dL Final    Alkaline Phosphatase 01/18/2023 77  35 - 104 U/L Final    ALT 01/18/2023 11  0 - 32 U/L Final    AST 01/18/2023 14  0 - 31 U/L Final    Uric Acid, Serum 01/18/2023 3.3  2.4 - 5.7 mg/dL Final     IMPRESSION:  1.  IUP at 33 weeks 0 days Estimated Date of Delivery: 3/8/23  2. Elevated blood pressure in the third trimester  3. Advanced maternal age  3. The patient has a history of white coat syndrome  5. She stated that she took propranolol in the past  6. COVID 19 infection in the first trimester  7. Reassuring biophysical profile and cord Doppler testing  8. Category 1 fetal heart rate tracing    PLAN:  She was advised to get a home blood pressure monitoring unit and check her blood pressures 3 times a day.  She is to call if her blood pressure exceeds 140/90, or if she has any new clinical symptomatology, including but not limited to, headache, change in vision, shortness of breath, chest pain, abdominal pain or any new clinically significant symptomatology. I would recommend that the patient count fetal movements and call if she notices any subjective decrease in fetal movements, particularly if there are less than 10 major movements in an hour. Non-stress testing should be performed every 3 to 4 days through the balance of the pregnancy. Serial ultrasounds to assess fetal anatomy and growth should be performed. The patient is at increase risk for  morbidity and mortality secondary to her history. Weekly BPP and cord Doppler testing should be performed, unless there is a clinical indication to perform the testing more frequently. The patient was advised to call if she has any increased vaginal discharge, vaginal bleeding, contractions, abdominal pain, back pain or any new significant symptomatology prior to her next visit. I advised her that these are signs and symptoms of cervical change and require follow-up assessment when they occur. I requested the patient return for a follow-up assessment in 1 week unless there is a clinical reason for her to return prior to that time. She is to call if she has any problems or questions prior to her next visit. Further evaluation and management will be dependent on her clinical presentation and the results of her testing. The patient is to continue to follow with you in your office for ongoing obstetric care. If you have any questions regarding her management, please contact me at your convenience and thank you for allowing me to participate in her care.     Sincerely,        Loretta Mercedes MD, MS, Judy Godinez, EMY, RDMS, RVT  Director [address] [place] [address]  [phone]

## 2023-01-18 NOTE — PROGRESS NOTES
Patient sent in by Dr. Raina Gustafson d/t increased blood pressures for monitoring and labs. Patient states + FM, and denies LOF, VB, and Ctx. Patient oriented to room and has call light within reach.

## 2023-01-18 NOTE — PROGRESS NOTES
Updated dr. Andrzej Yoder on [facility] labs and urine. New order to consult MFM, reviewed blood pressures with physician with no new orders at this time. MFM notified of new consult.

## 2023-01-19 NOTE — PROGRESS NOTES
Dr. Marla Bhatt updated that [facility] has cleared patient to be discharged to home.  Prescription for procardia given to patient

## 2023-01-19 NOTE — PROGRESS NOTES
Patient given verbal and written discharge instructions with standard labor precautions instructed to keep follow up appointment with physician. Make follow up appointment with MFM for this Friday   Patient verbalizes understanding states no questions or concerns. Patient ambulatory off unit with family member.

## 2023-01-20 ENCOUNTER — ANCILLARY PROCEDURE (OUTPATIENT)
Dept: OBGYN CLINIC | Age: 38
End: 2023-01-20
Payer: COMMERCIAL

## 2023-01-20 ENCOUNTER — INITIAL PRENATAL (OUTPATIENT)
Dept: OBGYN CLINIC | Age: 38
End: 2023-01-20
Payer: COMMERCIAL

## 2023-01-20 VITALS
SYSTOLIC BLOOD PRESSURE: 123 MMHG | DIASTOLIC BLOOD PRESSURE: 84 MMHG | BODY MASS INDEX: 25.46 KG/M2 | HEART RATE: 87 BPM | WEIGHT: 130.38 LBS

## 2023-01-20 DIAGNOSIS — Z3A.33 33 WEEKS GESTATION OF PREGNANCY: ICD-10-CM

## 2023-01-20 DIAGNOSIS — O13.9 GESTATIONAL HYPERTENSION AFFECTING THIRD PREGNANCY: ICD-10-CM

## 2023-01-20 DIAGNOSIS — O09.513 AMA (ADVANCED MATERNAL AGE) PRIMIGRAVIDA 35+, THIRD TRIMESTER: ICD-10-CM

## 2023-01-20 DIAGNOSIS — O16.3 ELEVATED BLOOD PRESSURE AFFECTING PREGNANCY IN THIRD TRIMESTER, ANTEPARTUM: Primary | ICD-10-CM

## 2023-01-20 LAB
GLUCOSE URINE, POC: NEGATIVE
PROTEIN UA: NEGATIVE

## 2023-01-20 PROCEDURE — 76820 UMBILICAL ARTERY ECHO: CPT | Performed by: OBSTETRICS & GYNECOLOGY

## 2023-01-20 PROCEDURE — 76818 FETAL BIOPHYS PROFILE W/NST: CPT | Performed by: OBSTETRICS & GYNECOLOGY

## 2023-01-20 PROCEDURE — 81002 URINALYSIS NONAUTO W/O SCOPE: CPT | Performed by: OBSTETRICS & GYNECOLOGY

## 2023-01-20 PROCEDURE — 99203 OFFICE O/P NEW LOW 30 MIN: CPT | Performed by: OBSTETRICS & GYNECOLOGY

## 2023-01-20 PROCEDURE — 99204 OFFICE O/P NEW MOD 45 MIN: CPT | Performed by: OBSTETRICS & GYNECOLOGY

## 2023-01-20 NOTE — PROGRESS NOTES
Pt here for BP check  Pt was seen in labor and delivery for elevated bp  Pt took her procardia this morning at 0945  Pt states good fetal movement

## 2023-01-20 NOTE — PATIENT INSTRUCTIONS

## 2023-01-21 NOTE — PROGRESS NOTES
23    Erna Lima MD  [address]                RE:  Collin Bernal  : 1985   AGE: 30 y.o. This report has been created using voice recognition software. It may contain errors which are inherent in voice recognition technology. Dear Dr. Ohara Me:     Moni Bustos had an appointment today for the following indications:     Patient Active Problem List   Diagnosis    MDD (major depressive disorder), recurrent severe, without psychosis ([facility]    Gestational hypertension affecting third pregnancy    AMA (advanced maternal age) primigravida 35+, third trimester    Elevated blood pressure affecting pregnancy in third trimester, antepartum      Moni Bustos is a 30 y.o. female, who is G1(0). She has an Estimated Date of Delivery: 3/8/23 based on her established dates. She is currently 33 weeks 2 days gestation based on that assessment. The patient was recently admitted for evaluation and management secondary to elevated blood pressures in Dr. Kimberlyn Estrada office. She states that her blood pressures have been elevated intermittently throughout her pregnancy. She is currently taking Procardia XL 30 mg daily for management of her blood pressures. The patient's initial blood pressure today was 136/92. After resting her blood pressure was 123/84. She had no symptomatology related to hypertension. The patient states that her fetal movement has been good. She has had no vaginal bleeding or leaking of amniotic fluid. She had no complaint of abdominal pain or tenderness. The nonstress test was reactive, with moderate variability and accelerations. A fetal ultrasound evaluation was performed on 2023. A detailed report included in the EMR under the imaging tab. A living mayo intrauterine fetus was identified in the cephalic presentation, with normal fetal heart motion and normal fetal motion noted. The amniotic fluid index was 12.4 cm. The placenta was anterior. The estimated fetal weight was at the 48th growth percentile for gestational age. No apparent gross fetal anatomic abnormalities were identified nearly visualized, however, visualization of the fetal anatomy was somewhat limited secondary to the advanced gestational age of the fetus in the fetal position. The biophysical profile and cord Doppler studies were both reassuring. There was no evidence of absence, or reversal of end-diastolic flow in the samples. A fetal ultrasound evaluation was performed on 2023. A detailed report included in the EMR under the imaging tab. A living mayo intrauterine fetus was identified in the cephalic presentation, with normal fetal heart motion and normal fetal motion noted. The amniotic fluid index was 9.1 cm. The placenta was anterior. The biophysical profile and cord Doppler studies were both reassuring. There was no evidence of absence, or reversal of end-diastolic flow in the samples obtained.                  GENETIC SCREENING/TERATOLOGY COUNSELING              (Includes patient, FTB, and any affected family members)     Patient Age > 35 Years YES   Thalassemia ( MVC<80) NO   Congential Heart Defect NO   Neural Tube Defect NO   Eleno-Sachs NO   Sickle Cell Disease NO   Sickle Cell Trait NO   Sickle C Disease or Trait NO   Hemophilia NO   Muscular Dystrophy NO   Cystic Fibrosis NO   Torrance Disease NO   Autism NO   Mental Retardation NO   History of Fragile X NO   Maternal Diabetes NO   Other Genetic Disease or Syndrome NO   Previous Child With Congenital Abnormality Not Listed NO   Recreational Drugs NO                                                                  INFECTION HISTORY         HEPATITIS IMMUNIZED:  YES   HEPATITIS INFECTION:  NO   EXPOSURE TO TB NO   GENITAL HERPES NO   PARVOVIRUS B-19 NO   CHICKEN POX  NO   MEASLES NO   HIV NO      OB History    Para Term  AB Living   2             SAB IAB Ectopic Molar Multiple Live Births                      # Outcome Date GA Lbr Benjamin/2nd Weight Sex Delivery Anes PTL Lv   2 Current                     1                            PAST GYNECOLOGICAL  HISTORY:  Negative for abnormal pap smears. Negative for sexually transmitted diseases. Negative for cervical LEEP / conization /cryosurgery. Negative for uterine surgery. Negative for ovarian or tubal surgery. Past Medical History:   Diagnosis Date    Alcohol dependency ([facility]      Anxiety and depression      Psychiatric problem              Past Surgical History   History reviewed. No pertinent surgical history. No Known Allergies     Current Medication   No current facility-administered medications for this encounter. Social History      Tobacco Use    Smoking status: Never    Smokeless tobacco: Never   Substance Use Topics    Alcohol use: Yes       Comment: liter of vodka daily      FAMILY MEDICAL HISTORY:   Negative for congenital abnormalities, autism, genetic disease and mental retardation, not listed above. Review of Systems :   CONSTITUTIONAL : No fever, no chills   HEENT : No headache, no visual changes, no rhinorrhea, no sore throat   CARDIOVASCULAR : No pain, no palpitations, no edema   RESPIRATORY : No pain, no shortness of breath   GASTROINTESTINAL : No N/V, no D/C, no abdominal pain   GENITOURINARY : No dysuria, hematuria and no incontinence   MUSCULOSKELETAL : No myalgia, No back pain  NEUROLOGICAL : No numbness, no tingling, no tremors. No history of seizures  ALL OTHER SYSTEMS WERE REPORTED AS NEGATIVE. PERTINENT PHYSICAL EXAMINATION:   /84   Pulse 87   Wt 130 lb 6 oz (59.1 kg)   LMP 2022   BMI 25.46 kg/m²   Negative Glucose  Negative Protein    GENERAL:   The patient is a well developed, female who is alert cooperative and oriented times three in no acute distress. HEENT:  Normo cephalic and atraumatic. No facial edema.       ABDOMEN:   Her uterus is gravid. She had no complaint of abdominal pain or tenderness. The fetal heart rate is 127 bpm. The fetus is in the cephalic presentation which was confirmed by the ultrasound assessment. EXTREMITIES:  No peripheral edema is noted. NEUROLOGICAL ASSESSMENT:  Reflexes are 2+/4+ and equal bilaterally. PELVIC EXAMINATION:  Not repeated at this time since the patient was not having contraction activity or abdominal discomfort.      Admission on 01/18/2023   Component Date Value Ref Range Status    WBC 01/18/2023 8.5  4.5 - 11.5 E9/L Final    RBC 01/18/2023 3.88  3.50 - 5.50 E12/L Final    Hemoglobin 01/18/2023 12.2  11.5 - 15.5 g/dL Final    Hematocrit 01/18/2023 36.1  34.0 - 48.0 % Final    MCV 01/18/2023 93.0  80.0 - 99.9 fL Final    MCH 01/18/2023 31.4  26.0 - 35.0 pg Final    MCHC 01/18/2023 33.8  32.0 - 34.5 % Final    RDW 01/18/2023 12.8  11.5 - 15.0 fL Final    Platelets 86/05/3143 220  130 - 450 E9/L Final    MPV 01/18/2023 9.2  7.0 - 12.0 fL Final    Color, UA 01/18/2023 Yellow  Straw/Yellow Final    Clarity, UA 01/18/2023 Clear  Clear Final    Glucose, Ur 01/18/2023 Negative  Negative mg/dL Final    Bilirubin Urine 01/18/2023 Negative  Negative Final    Ketones, Urine 01/18/2023 Negative  Negative mg/dL Final    Specific Gravity, UA 01/18/2023 <=1.005  1.005 - 1.030 Final    Blood, Urine 01/18/2023 Negative  Negative Final    pH, UA 01/18/2023 7.0  5.0 - 9.0 Final    Protein, UA 01/18/2023 Negative  Negative mg/dL Final    Urobilinogen, Urine 01/18/2023 0.2  <2.0 E.U./dL Final    Nitrite, Urine 01/18/2023 Negative  Negative Final    Leukocyte Esterase, Urine 01/18/2023 TRACE (A)  Negative Final    Protein, Ur 01/18/2023 <4  0 - 12 mg/dL Final    Creatinine, Ur 01/18/2023 18 (A)  29 - 226 mg/dL Final    Protein/Creat Ratio 01/18/2023 0.2  0.0 - 0.2 Final    Protein/Creat Ratio 01/18/2023 0.2    Final    aPTT 01/18/2023 24.9  24.5 - 35.1 sec Final    Protime 01/18/2023 9.5  9.3 - 12.4 sec Final INR 01/18/2023 0.9    Final    Fibrinogen 01/18/2023 407 (A)  200 - 400 mg/dL Final    D-Dimer, Quant 01/18/2023 1019  ng/mL DDU Final    WBC, UA 01/18/2023 NONE  0 - 5 /HPF Final    RBC, UA 01/18/2023 NONE  0 - 2 /HPF Final    Bacteria, UA 01/18/2023 NONE SEEN  None Seen /HPF Final    Sodium 01/18/2023 136  132 - 146 mmol/L Final    Potassium 01/18/2023 4.1  3.5 - 5.0 mmol/L Final    Chloride 01/18/2023 107  98 - 107 mmol/L Final    CO2 01/18/2023 19 (A)  22 - 29 mmol/L Final    Anion Gap 01/18/2023 10  7 - 16 mmol/L Final    Glucose 01/18/2023 81  74 - 99 mg/dL Final    BUN 01/18/2023 7  6 - 20 mg/dL Final    Creatinine 01/18/2023 0.4 (A)  0.5 - 1.0 mg/dL Final    Est, Glom Filt Rate 01/18/2023 >60  >=60 mL/min/1.73 Final    Calcium 01/18/2023 8.7  8.6 - 10.2 mg/dL Final    Total Protein 01/18/2023 6.1 (A)  6.4 - 8.3 g/dL Final    Albumin 01/18/2023 3.5  3.5 - 5.2 g/dL Final    Total Bilirubin 01/18/2023 0.3  0.0 - 1.2 mg/dL Final    Alkaline Phosphatase 01/18/2023 77  35 - 104 U/L Final    ALT 01/18/2023 11  0 - 32 U/L Final    AST 01/18/2023 14  0 - 31 U/L Final    Uric Acid, Serum 01/18/2023 3.3  2.4 - 5.7 mg/dL Final      IMPRESSION:  1.  IUP at 33 weeks 2 days Estimated Date of Delivery: 3/8/23  2. Elevated blood pressure in the third trimester, taking Procardia XL  3. Advanced maternal age  3. The patient has a history of white coat syndrome  5. She stated that she took propranolol in the past  6. COVID 19 infection in the first trimester  7. Reassuring biophysical profile and cord Doppler testing 1/20/2023  8. Reactive nonstress test 1/20/2023     PLAN:  The patient was advised to check her blood pressures 3 times a day. She is to call if her blood pressure exceeds 140/90, or if she has any new clinical symptomatology, including but not limited to, headache, change in vision, shortness of breath, chest pain, abdominal pain or any new clinically significant symptomatology.       As we discussed at the time of the patient's assessment, I would recommend that the patient count fetal movements and call if she notices any subjective decrease in fetal movements, particularly if there are less than 10 major movements in an hour. Non-stress testing should be performed every 3 to 4 days through the balance of the pregnancy. Serial ultrasounds to assess fetal anatomy and growth should be performed. The patient is at increase risk for  morbidity and mortality secondary to her history. Weekly BPP and cord Doppler testing should be performed, unless there is a clinical indication to perform the testing more frequently. The patient was advised to call if she has any increased vaginal discharge, vaginal bleeding, contractions, abdominal pain, back pain or any new significant symptomatology prior to her next visit. I advised her that these are signs and symptoms of cervical change and require follow-up assessment when they occur. I requested the patient return for a follow-up assessment in 1 week unless there is a clinical reason for her to return prior to that time. She is to call if she has any problems or questions prior to her next visit. Further evaluation and management will be dependent on her clinical presentation and the results of her testing. The patient is to continue to follow with you in your office for ongoing obstetric care. The total time in minutes spent with the patient, reviewing medical records, reviewing imaging studies, performing ultrasonic imaging, reviewing laboratory testing, and documenting information was 46 minutes, of which, 50% of the time was spent in patient education, counseling, and coordinating care with the patient, her provider, and/or her family. This included our telephone conversation at the time of the patient's assessment to discuss the results of her testing and evaluation today and my recommendations for management.   Available paper and electronic medical records were reviewed. The patient's records from her recent admission were reviewed. I discussed with the patient the results of her fetal ultrasound evaluation today. I reviewed with her the importance of taking her medications as directed, and the increased risks associated with hypertension in pregnancy. I discussed with her signs and symptoms of hypertension. I reviewed with her the results of her fetal ultrasound evaluation and fetal testing performed today. I discussed with her my recommendation for ongoing evaluation and management throughout the balance of her pregnancy. These recommendations may change depending on the patient's clinical presentation and the results of her testing. I answered all of her questions to her satisfaction. I asked her to call if she had any additional questions prior to her next visit. If you have any questions regarding her management, please contact me at your convenience and thank you for allowing me to participate in her care.      Sincerely,           Shasta Mueller MD, MS, Glenn Alcantara, RDCS, RDMS, RVT  Director [address] [place] [address]  [phone]

## 2023-01-27 ENCOUNTER — HOSPITAL ENCOUNTER (OUTPATIENT)
Age: 38
Discharge: HOME | End: 2023-01-27
Attending: OBSTETRICS & GYNECOLOGY | Admitting: OBSTETRICS & GYNECOLOGY
Payer: COMMERCIAL

## 2023-01-27 ENCOUNTER — ROUTINE PRENATAL (OUTPATIENT)
Dept: OBGYN CLINIC | Age: 38
End: 2023-01-27
Payer: COMMERCIAL

## 2023-01-27 ENCOUNTER — ANCILLARY PROCEDURE (OUTPATIENT)
Dept: OBGYN CLINIC | Age: 38
End: 2023-01-27
Payer: COMMERCIAL

## 2023-01-27 VITALS
HEART RATE: 73 BPM | WEIGHT: 129 LBS | RESPIRATION RATE: 18 BRPM | HEIGHT: 60 IN | TEMPERATURE: 98 F | SYSTOLIC BLOOD PRESSURE: 127 MMHG | BODY MASS INDEX: 25.32 KG/M2 | DIASTOLIC BLOOD PRESSURE: 84 MMHG

## 2023-01-27 VITALS
DIASTOLIC BLOOD PRESSURE: 92 MMHG | SYSTOLIC BLOOD PRESSURE: 155 MMHG | HEART RATE: 76 BPM | WEIGHT: 130 LBS | BODY MASS INDEX: 25.39 KG/M2

## 2023-01-27 DIAGNOSIS — O13.9 GESTATIONAL HYPERTENSION AFFECTING THIRD PREGNANCY: Primary | ICD-10-CM

## 2023-01-27 DIAGNOSIS — O09.513 AMA (ADVANCED MATERNAL AGE) PRIMIGRAVIDA 35+, THIRD TRIMESTER: ICD-10-CM

## 2023-01-27 DIAGNOSIS — Z3A.34 34 WEEKS GESTATION OF PREGNANCY: ICD-10-CM

## 2023-01-27 DIAGNOSIS — O16.3 ELEVATED BLOOD PRESSURE AFFECTING PREGNANCY IN THIRD TRIMESTER, ANTEPARTUM: ICD-10-CM

## 2023-01-27 PROBLEM — O13.1 PIH (PREGNANCY INDUCED HYPERTENSION), FIRST TRIMESTER: Status: ACTIVE | Noted: 2023-01-27

## 2023-01-27 LAB
ALBUMIN SERPL-MCNC: 3.5 G/DL (ref 3.5–5.2)
ALP BLD-CCNC: 90 U/L (ref 35–104)
ALT SERPL-CCNC: 13 U/L (ref 0–32)
ANION GAP SERPL CALCULATED.3IONS-SCNC: 8 MMOL/L (ref 7–16)
APTT: 25.5 SEC (ref 24.5–35.1)
AST SERPL-CCNC: 13 U/L (ref 0–31)
BASOPHILS ABSOLUTE: 0.05 E9/L (ref 0–0.2)
BASOPHILS RELATIVE PERCENT: 0.5 % (ref 0–2)
BILIRUB SERPL-MCNC: 0.2 MG/DL (ref 0–1.2)
BUN BLDV-MCNC: 10 MG/DL (ref 6–20)
CALCIUM SERPL-MCNC: 9.4 MG/DL (ref 8.6–10.2)
CHLORIDE BLD-SCNC: 104 MMOL/L (ref 98–107)
CO2: 20 MMOL/L (ref 22–29)
CREAT SERPL-MCNC: 0.5 MG/DL (ref 0.5–1)
CREATININE URINE: 15 MG/DL (ref 29–226)
D DIMER: 920 NG/ML DDU
EOSINOPHILS ABSOLUTE: 0.04 E9/L (ref 0.05–0.5)
EOSINOPHILS RELATIVE PERCENT: 0.4 % (ref 0–6)
FIBRINOGEN: 392 MG/DL (ref 200–400)
GFR SERPL CREATININE-BSD FRML MDRD: >60 ML/MIN/1.73
GLUCOSE BLD-MCNC: 77 MG/DL (ref 74–99)
GLUCOSE URINE, POC: NORMAL
HCT VFR BLD CALC: 36.6 % (ref 34–48)
HEMOGLOBIN: 12 G/DL (ref 11.5–15.5)
IMMATURE GRANULOCYTES #: 0.03 E9/L
IMMATURE GRANULOCYTES %: 0.3 % (ref 0–5)
INR BLD: 0.9
LYMPHOCYTES ABSOLUTE: 1.97 E9/L (ref 1.5–4)
LYMPHOCYTES RELATIVE PERCENT: 20.7 % (ref 20–42)
MCH RBC QN AUTO: 30.4 PG (ref 26–35)
MCHC RBC AUTO-ENTMCNC: 32.8 % (ref 32–34.5)
MCV RBC AUTO: 92.7 FL (ref 80–99.9)
MONOCYTES ABSOLUTE: 0.59 E9/L (ref 0.1–0.95)
MONOCYTES RELATIVE PERCENT: 6.2 % (ref 2–12)
NEUTROPHILS ABSOLUTE: 6.85 E9/L (ref 1.8–7.3)
NEUTROPHILS RELATIVE PERCENT: 71.9 % (ref 43–80)
PDW BLD-RTO: 12.5 FL (ref 11.5–15)
PLATELET # BLD: 231 E9/L (ref 130–450)
PMV BLD AUTO: 9.1 FL (ref 7–12)
POTASSIUM SERPL-SCNC: 4.2 MMOL/L (ref 3.5–5)
PROTEIN PROTEIN: ABNORMAL MG/DL (ref 0–12)
PROTEIN UA: NEGATIVE
PROTEIN/CREAT RATIO: 0.3
PROTEIN/CREAT RATIO: ABNORMAL (ref 0–0.2)
PROTHROMBIN TIME: 10 SEC (ref 9.3–12.4)
RBC # BLD: 3.95 E12/L (ref 3.5–5.5)
SODIUM BLD-SCNC: 132 MMOL/L (ref 132–146)
TOTAL PROTEIN: 6.3 G/DL (ref 6.4–8.3)
URIC ACID, SERUM: 3.5 MG/DL (ref 2.4–5.7)
WBC # BLD: 9.5 E9/L (ref 4.5–11.5)

## 2023-01-27 PROCEDURE — 99214 OFFICE O/P EST MOD 30 MIN: CPT | Performed by: OBSTETRICS & GYNECOLOGY

## 2023-01-27 PROCEDURE — 84550 ASSAY OF BLOOD/URIC ACID: CPT

## 2023-01-27 PROCEDURE — 85384 FIBRINOGEN ACTIVITY: CPT

## 2023-01-27 PROCEDURE — 85025 COMPLETE CBC W/AUTO DIFF WBC: CPT

## 2023-01-27 PROCEDURE — 84156 ASSAY OF PROTEIN URINE: CPT

## 2023-01-27 PROCEDURE — 59025 FETAL NON-STRESS TEST: CPT

## 2023-01-27 PROCEDURE — G0378 HOSPITAL OBSERVATION PER HR: HCPCS

## 2023-01-27 PROCEDURE — 82570 ASSAY OF URINE CREATININE: CPT

## 2023-01-27 PROCEDURE — 85378 FIBRIN DEGRADE SEMIQUANT: CPT

## 2023-01-27 PROCEDURE — 76820 UMBILICAL ARTERY ECHO: CPT | Performed by: OBSTETRICS & GYNECOLOGY

## 2023-01-27 PROCEDURE — 80053 COMPREHEN METABOLIC PANEL: CPT

## 2023-01-27 PROCEDURE — 81002 URINALYSIS NONAUTO W/O SCOPE: CPT | Performed by: OBSTETRICS & GYNECOLOGY

## 2023-01-27 PROCEDURE — 36415 COLL VENOUS BLD VENIPUNCTURE: CPT

## 2023-01-27 PROCEDURE — 99222 1ST HOSP IP/OBS MODERATE 55: CPT | Performed by: ADVANCED PRACTICE MIDWIFE

## 2023-01-27 PROCEDURE — 76818 FETAL BIOPHYS PROFILE W/NST: CPT | Performed by: OBSTETRICS & GYNECOLOGY

## 2023-01-27 PROCEDURE — 99213 OFFICE O/P EST LOW 20 MIN: CPT | Performed by: OBSTETRICS & GYNECOLOGY

## 2023-01-27 PROCEDURE — 85610 PROTHROMBIN TIME: CPT

## 2023-01-27 PROCEDURE — 85730 THROMBOPLASTIN TIME PARTIAL: CPT

## 2023-01-27 RX ORDER — LABETALOL 100 MG/1
100 TABLET, FILM COATED ORAL 2 TIMES DAILY
COMMUNITY

## 2023-01-27 RX ORDER — LABETALOL 100 MG/1
TABLET, FILM COATED ORAL
Status: ON HOLD | COMMUNITY
Start: 2023-01-19 | End: 2023-01-27 | Stop reason: DRUGHIGH

## 2023-01-27 RX ORDER — LABETALOL 100 MG/1
100 TABLET, FILM COATED ORAL 3 TIMES DAILY
Qty: 60 TABLET | Refills: 3 | Status: SHIPPED | OUTPATIENT
Start: 2023-01-27 | End: 2023-04-17

## 2023-01-27 NOTE — PROGRESS NOTES
Pt here for high blood pressure. Pt in room 331. Placed on efm. pt denies h/a blurred vision, epigastric pain no swelling. Baby moving. No bleeding or leaking.  Pt 34 weeks g1 po

## 2023-01-27 NOTE — PROGRESS NOTES
Patient is here today for weekly f/u. Patient switched BP medication, feeling a lot better. Decreased swelling. Denies any vaginal bleeding, cramping, or leakage of fluids. Patient reports good fetal movement.

## 2023-01-27 NOTE — H&P
Department of Obstetrics and Gynecology  Midwife Obstetrics History and Physical  Admission H and P / Observation Initial Evaluation        CHIEF COMPLAINT:  Sent from Dr Clovis Ochoa office to evaluate elevated blood pressures    HISTORY OF PRESENT ILLNESS:  Nish Velasco is a 30 y.o. female , Patient's last menstrual period was 2022.,  at 34w2d. Presents to L&D with  No headache, epigastric pain, or visual changes  2. No edema  3. No contractions. Positive fetal movement. 4. Taking Labetolol 100 mg PO BID  5. See recent admission for pre-eclampsia evaluation on : P/C ratio 0.2, labs were WNL. Was started on labetolol    Prenatals reviewed    OB History          1    Para        Term                AB        Living             SAB        IAB        Ectopic        Molar        Multiple        Live Births                    Estimated Due Date: determined by: Pt stated      Pregnancy complicated by:   Patient Active Problem List   Diagnosis Code    MDD (major depressive disorder), recurrent severe, without psychosis ([facility] F33.2    Gestational hypertension affecting third pregnancy O13.9    AMA (advanced maternal age) primigravida 33+, third trimester O09.513    Elevated blood pressure affecting pregnancy in third trimester, antepartum O16.3    PIH (pregnancy induced hypertension), antepartum O13.9     PAST OB HISTORY  OB History          1    Para        Term                AB        Living             SAB        IAB        Ectopic        Molar        Multiple        Live Births                    Past Medical History:        Diagnosis Date    Alcohol dependency ([address]     Anxiety and depression     Psychiatric problem      Specifically no history of asthma. Past Surgical History:    No past surgical history on file. Specifically no history of anesthesia reactions or problems. No history of bleeding or trouble healing / recovering from surgery.     Social History: TOBACCO:   reports that she has never smoked. She has never used smokeless tobacco.  ETOH:   reports current alcohol use. DRUGS:   reports no history of drug use. Family History:       Problem Relation Age of Onset    Depression Mother     Anxiety Disorder Mother     Depression Maternal Aunt     Anxiety Disorder Maternal Aunt      Specifically no family history of bleeding problems or anesthesia reactions. Medications Prior to Admission:  Medications Prior to Admission: labetalol (NORMODYNE) 100 MG tablet, TAKE 1/2 TABLET BY MOUTH WITH BREAKFAST DAILY, TAKE A 1/2 TABLET AS NEEDED DOSE 1 HOUR PRIOR TO DOCTOR'S OFFICE VISITS OR EXERCISE  Prenatal MV-Min-Fe Fum-FA-DHA (PRENATAL 1 PO), Take by mouth  NIFEdipine (PROCARDIA XL) 30 MG extended release tablet, Take 1 tablet by mouth daily (Patient not taking: Reported on 1/27/2023)    Allergies:  Patient has no known allergies. Prenatal Labs    CBC:   Lab Results   Component Value Date    WBC 9.5 01/27/2023    HGB 12.0 01/27/2023    HCT 36.6 01/27/2023    MCV 92.7 01/27/2023     01/27/2023     in CBC      REVIEW OF SYSTEMS:          CONSTITUTIONAL :      No fever, no chills   HEENT :                         Headache absent,   visual disturbances absent  CARDIOVASCULAR :    No chest pain, no palpitations, no edema   RESPIRATORY :            No pain, no shortness of breath   ABDOMEN/Uterus: non tender  GASTROINTESTINAL : No N/V, no D/C,    abdominal pain absent   GENITOURINARY :      Dysuria   absent,   hematuria absent,   urinary frequency absent  Vaginal bleeding absent  Vaginal discharge absent  MUSCULOSKELETAL:  No myalgia,   back pain absent  NEUROLOGICAL :    No migraine, no seizures.    INTEGUMENTARY: Denies lesions or rashes, Edema none    Pertinent positives and negatives addressed in HPI, other systems reviewed and negative      PHYSICAL EXAM:    LMP 06/01/2022     General appearance:  awake, alert, cooperative, no apparent distress, and appears stated age  Neurologic:  Awake, alert, oriented to name, place and time. Ambulatory to unit    Lungs:  Respirations easy and unlabored  Abdomen:   soft, gravid, non-tender,    Fetal position vertex by Leopold's   EFW AGA  : CVA tenderness absent  EXTREMITIES:   Fetal heart rate:  Baseline Heart Rate 140   Variability moderate   Accelerations Present   Decelerations absent  Membranes:  Intact       Impression:    30 y.o.  34w2d   2, GBS: unknown  3. Fetal Heart Tracing category: 1  4. Gestational HTN  5. No clinical signs pre-eclampsia    DW Dr Agustín Henry:  Change dose Labetolol to 100 mg PO TID  Will obtain NST  If tracing reactive and labs WNL. May D/C home.              Electronically signed by CHIRAG Rivero CNM on 2023 at 5:49 PM

## 2023-01-27 NOTE — PATIENT INSTRUCTIONS

## 2023-01-27 NOTE — PROGRESS NOTES
23    Brandi Huerta MD  [address]                RE:  Shannan Yan  : 1985   AGE: 30 y.o. This report has been created using voice recognition software. It may contain errors which are inherent in voice recognition technology. Dear Dr. Lai Caballero:     Mila Chaudhry had an appointment today for the following indications:     Patient Active Problem List   Diagnosis    MDD (major depressive disorder), recurrent severe, without [facility]    Gestational hypertension affecting third pregnancy    AMA (advanced maternal age) primigravida 35+, third trimester    Elevated blood pressure affecting pregnancy in third trimester, antepartum      Mila Chaudhry is a 30 y.o. female, who is G1(0). She has an Estimated Date of Delivery: 3/8/23 based on her established dates. She is currently 34 weeks 2 days gestation based on that assessment. The patient was recently admitted for evaluation and management secondary to elevated blood pressures in Dr. Yosef Cid office. She states that her blood pressures have been elevated intermittently throughout her pregnancy. She is currently taking labetalol 100 mg every 12 hours for management of her blood pressures. The patient's initial blood pressure today was 150/83. Repeat assessments were 153/96 and 155/92. The patient had no proteinuria. She had no symptomatology related to hypertension. The patient did not bring in her blood pressure log from home. The patient states that her fetal movement has been good. She has had no vaginal bleeding or leaking of amniotic fluid. She had no complaint of abdominal pain or tenderness. The nonstress test was reactive, with moderate variability and accelerations. A fetal ultrasound evaluation was performed on 2023. A detailed report included in the EMR under the imaging tab.   A living mayo intrauterine fetus was identified in the cephalic presentation, with normal fetal heart motion and normal fetal motion noted. The amniotic fluid index was 12.4 cm. The placenta was anterior. The estimated fetal weight was at the 48th growth percentile for gestational age. No apparent gross fetal anatomic abnormalities were identified nearly visualized, however, visualization of the fetal anatomy was somewhat limited secondary to the advanced gestational age of the fetus in the fetal position. The biophysical profile and cord Doppler studies were both reassuring. There was no evidence of absence, or reversal of end-diastolic flow in the samples. A fetal ultrasound evaluation was performed on 1/20/2023. A detailed report included in the EMR under the imaging tab. A living mayo intrauterine fetus was identified in the cephalic presentation, with normal fetal heart motion and normal fetal motion noted. The amniotic fluid index was 9.1 cm. The placenta was anterior. The biophysical profile and cord Doppler studies were both reassuring. There was no evidence of absence, or reversal of end-diastolic flow in the samples obtained. A fetal ultrasound evaluation was performed on 1/27/2023. A detailed report included in the EMR under the imaging tab. A living mayo intrauterine fetus was identified in the cephalic presentation, with normal fetal heart motion and normal fetal motion noted. The amniotic fluid index was 13 cm. The placenta was anterior. The biophysical profile and cord Doppler studies were both reassuring. There was no evidence of absence, or reversal of end-diastolic flow in the samples obtained.                  GENETIC SCREENING/TERATOLOGY COUNSELING              (Includes patient, FTB, and any affected family members)     Patient Age > 35 Years YES   Thalassemia ( MVC<80) NO   Congential Heart Defect NO   Neural Tube Defect NO   Eleno-Sachs NO   Sickle Cell Disease NO   Sickle Cell Trait NO   Sickle C Disease or Trait NO   Hemophilia NO Muscular Dystrophy NO   Cystic Fibrosis NO   Kirk Disease NO   Autism NO   Mental Retardation NO   History of Fragile X NO   Maternal Diabetes NO   Other Genetic Disease or Syndrome NO   Previous Child With Congenital Abnormality Not Listed NO   Recreational Drugs NO                                                                  INFECTION HISTORY         HEPATITIS IMMUNIZED:  YES   HEPATITIS INFECTION:  NO   EXPOSURE TO TB NO   GENITAL HERPES NO   PARVOVIRUS B-19 NO   CHICKEN POX  NO   MEASLES NO   HIV NO      OB History    Para Term  AB Living   2             SAB IAB Ectopic Molar Multiple Live Births                      # Outcome Date GA Lbr Benjamin/2nd Weight Sex Delivery Anes PTL Lv   2 Current                     1                            PAST GYNECOLOGICAL  HISTORY:  Negative for abnormal pap smears. Negative for sexually transmitted diseases. Negative for cervical LEEP / conization /cryosurgery. Negative for uterine surgery. Negative for ovarian or tubal surgery. Past Medical History:   Diagnosis Date    Alcohol dependency ([facility]      Anxiety and depression      Psychiatric problem              Past Surgical History   History reviewed. No pertinent surgical history. No Known Allergies    Medications:  Labetalol 100 mg every 12 hours  Prenatal vitamin daily     Social History      Tobacco Use    Smoking status: Never    Smokeless tobacco: Never   Substance Use Topics    Alcohol use: Yes       Comment: liter of vodka daily      FAMILY MEDICAL HISTORY:   Negative for congenital abnormalities, autism, genetic disease and mental retardation, not listed above.       Review of Systems :   CONSTITUTIONAL : No fever, no chills   HEENT : No headache, no visual changes, no rhinorrhea, no sore throat   CARDIOVASCULAR : No pain, no palpitations, no edema   RESPIRATORY : No pain, no shortness of breath   GASTROINTESTINAL : No N/V, no D/C, no abdominal pain   GENITOURINARY : No dysuria, hematuria and no incontinence   MUSCULOSKELETAL : No myalgia, No back pain  NEUROLOGICAL : No numbness, no tingling, no tremors. No history of seizures  ALL OTHER SYSTEMS WERE REPORTED AS NEGATIVE. PERTINENT PHYSICAL EXAMINATION:   BP (!) 155/92   Pulse 76   Wt 130 lb (59 kg)   LMP 06/01/2022   BMI 25.39 kg/m²   Negative Glucose  Negative Protein    GENERAL:   The patient is a well developed, female who is alert cooperative and oriented times three in no acute distress. HEENT:  Normo cephalic and atraumatic. No facial edema. ABDOMEN:   Her uterus is gravid. She had no complaint of abdominal pain or tenderness. The fetal heart rate is 131 bpm. The fetus was in the cephalic presentation which was confirmed by the ultrasound assessment. EXTREMITIES:  No peripheral edema is noted. PELVIC EXAMINATION:  Not repeated at this time since the patient was not having contraction activity or abdominal discomfort.      Admission on 01/18/2023   Component Date Value Ref Range Status    WBC 01/18/2023 8.5  4.5 - 11.5 E9/L Final    RBC 01/18/2023 3.88  3.50 - 5.50 E12/L Final    Hemoglobin 01/18/2023 12.2  11.5 - 15.5 g/dL Final    Hematocrit 01/18/2023 36.1  34.0 - 48.0 % Final    MCV 01/18/2023 93.0  80.0 - 99.9 fL Final    MCH 01/18/2023 31.4  26.0 - 35.0 pg Final    MCHC 01/18/2023 33.8  32.0 - 34.5 % Final    RDW 01/18/2023 12.8  11.5 - 15.0 fL Final    Platelets 90/67/7159 220  130 - 450 E9/L Final    MPV 01/18/2023 9.2  7.0 - 12.0 fL Final    Color, UA 01/18/2023 Yellow  Straw/Yellow Final    Clarity, UA 01/18/2023 Clear  Clear Final    Glucose, Ur 01/18/2023 Negative  Negative mg/dL Final    Bilirubin Urine 01/18/2023 Negative  Negative Final    Ketones, Urine 01/18/2023 Negative  Negative mg/dL Final    Specific Gravity, UA 01/18/2023 <=1.005  1.005 - 1.030 Final    Blood, Urine 01/18/2023 Negative  Negative Final    pH, UA 01/18/2023 7.0  5.0 - 9.0 Final    Protein, UA 01/18/2023 Negative  Negative mg/dL Final    Urobilinogen, Urine 01/18/2023 0.2  <2.0 E.U./dL Final    Nitrite, Urine 01/18/2023 Negative  Negative Final    Leukocyte Esterase, Urine 01/18/2023 TRACE (A)  Negative Final    Protein, Ur 01/18/2023 <4  0 - 12 mg/dL Final    Creatinine, Ur 01/18/2023 18 (A)  29 - 226 mg/dL Final    Protein/Creat Ratio 01/18/2023 0.2  0.0 - 0.2 Final    Protein/Creat Ratio 01/18/2023 0.2    Final    aPTT 01/18/2023 24.9  24.5 - 35.1 sec Final    Protime 01/18/2023 9.5  9.3 - 12.4 sec Final    INR 01/18/2023 0.9    Final    Fibrinogen 01/18/2023 407 (A)  200 - 400 mg/dL Final    D-Dimer, Quant 01/18/2023 1019  ng/mL DDU Final    WBC, UA 01/18/2023 NONE  0 - 5 /HPF Final    RBC, UA 01/18/2023 NONE  0 - 2 /HPF Final    Bacteria, UA 01/18/2023 NONE SEEN  None Seen /HPF Final    Sodium 01/18/2023 136  132 - 146 mmol/L Final    Potassium 01/18/2023 4.1  3.5 - 5.0 mmol/L Final    Chloride 01/18/2023 107  98 - 107 mmol/L Final    CO2 01/18/2023 19 (A)  22 - 29 mmol/L Final    Anion Gap 01/18/2023 10  7 - 16 mmol/L Final    Glucose 01/18/2023 81  74 - 99 mg/dL Final    BUN 01/18/2023 7  6 - 20 mg/dL Final    Creatinine 01/18/2023 0.4 (A)  0.5 - 1.0 mg/dL Final    Est, Glom Filt Rate 01/18/2023 >60  >=60 mL/min/1.73 Final    Calcium 01/18/2023 8.7  8.6 - 10.2 mg/dL Final    Total Protein 01/18/2023 6.1 (A)  6.4 - 8.3 g/dL Final    Albumin 01/18/2023 3.5  3.5 - 5.2 g/dL Final    Total Bilirubin 01/18/2023 0.3  0.0 - 1.2 mg/dL Final    Alkaline Phosphatase 01/18/2023 77  35 - 104 U/L Final    ALT 01/18/2023 11  0 - 32 U/L Final    AST 01/18/2023 14  0 - 31 U/L Final    Uric Acid, Serum 01/18/2023 3.3  2.4 - 5.7 mg/dL Final      IMPRESSION:  1.  IUP at 34 weeks 2 days Estimated Date of Delivery: 3/8/23  2. Elevated blood pressure in the third trimester, taking labetalol 100 mg twice daily  3. Advanced maternal age  3. The patient has a history of white coat syndrome  5.   She stated that she took propranolol in the past  6. COVID 19 infection in the first trimester  7. Reassuring biophysical profile and cord Doppler testing 1/27/2023  8. Reactive nonstress test 1/27/2023     PLAN:  As we discussed at the time of the patient's assessment, I recommended that the patient be admitted for further evaluation and management secondary to elevated blood pressures noted today. Laboratory testing should be ordered including a CMP, CBC, uric acid, fibrinogen, APTT, PT/INR, urinalysis with microscopic evaluation, urine drug screen, and protein creatinine ratio. Continuous fetal heart rate and uterine contraction monitoring should be utilized for now. DVT prophylaxis should be utilized throughout her admission. Further evaluation and management will be dependent on the results of the patient's testing and her clinical presentation. The total time in minutes spent with the patient, reviewing medical records, reviewing imaging studies, performing ultrasonic imaging, reviewing laboratory testing, and documenting information was 33 minutes, of which, 50% of the time was spent in patient education, counseling, and coordinating care with the patient, her provider, and/or her family. This included our telephone conversation at the time of the patient's assessment to discuss the results of her testing and evaluation today and my recommendations for management. This also included my telephone conversation with the labor and delivery staff regarding the patient's findings and recommendations for management. Available paper and electronic medical records were reviewed. The patient's records from her recent admission were reviewed. I discussed with the patient the results of her fetal ultrasound evaluation today. I discussed with her the elevated blood pressures noted today and my recommendations for admission for further evaluation and management. I answered all of her questions to her satisfaction.         If you have any questions regarding her management, please contact me at your convenience and thank you for allowing me to participate in her care.      Sincerely,           Kirill Reddy MD, MS, Jennie Veliz, ILEANACS, RDMS, RVT  Director [address] [place] [address]  [phone]

## 2023-01-28 NOTE — DISCHARGE INSTRUCTIONS
Home Undelivered Discharge Instructions    After Discharge Orders:    Future Appointments   Date Time Provider Eufemia Yang   2/3/2023  2:30 PM Allen Ashley MD [facility]   2/10/2023  2:30 PM Allen Ashley MD [facility]   2/17/2023  1:00 PM Allen Ashley MD [facility]   2/24/2023  2:30 PM Allen Ashley MD [facility]   3/3/2023  2:30 PM Allen Ashley MD [facility]       Call physician or midwife's office on *** for instructions. Diet:  normal diet as tolerated    Rest: normal activity as tolerated    Other instructions: Do kick counts once a day on your baby. Choose the time of day your baby is most active. Get in a comfortable lying or sitting position and time how long it takes to feel 10 kicks, twists, turns, swishes, or rolls.  Call your physician or midwife if there have not been 10 kicks in 2 hours    Call physician or midwife, return to Labor and Delivery, call 911, or go to the nearest Emergency Room if: increased leakage or fluid, contractions more than  5 per  1 hour, decreased fetal movement, persistent low back pain or cramping, bleeding from vaginal area, difficulty urinating, pain with urination, difficulty breathing, new calf pain, persistent headache, or vision change

## 2023-01-28 NOTE — PROGRESS NOTES
Gave d/c instructions, pt states understanding. Ambulated off unit with sig other.  Condition stable

## 2023-02-03 ENCOUNTER — ROUTINE PRENATAL (OUTPATIENT)
Dept: OBGYN CLINIC | Age: 38
End: 2023-02-03
Payer: COMMERCIAL

## 2023-02-03 ENCOUNTER — ANCILLARY PROCEDURE (OUTPATIENT)
Dept: OBGYN CLINIC | Age: 38
End: 2023-02-03
Payer: COMMERCIAL

## 2023-02-03 VITALS
SYSTOLIC BLOOD PRESSURE: 131 MMHG | WEIGHT: 132 LBS | HEART RATE: 84 BPM | BODY MASS INDEX: 25.78 KG/M2 | DIASTOLIC BLOOD PRESSURE: 87 MMHG

## 2023-02-03 DIAGNOSIS — O16.3 ELEVATED BLOOD PRESSURE AFFECTING PREGNANCY IN THIRD TRIMESTER, ANTEPARTUM: Primary | ICD-10-CM

## 2023-02-03 DIAGNOSIS — O13.9 GESTATIONAL HYPERTENSION AFFECTING THIRD PREGNANCY: ICD-10-CM

## 2023-02-03 DIAGNOSIS — O09.513 AMA (ADVANCED MATERNAL AGE) PRIMIGRAVIDA 35+, THIRD TRIMESTER: ICD-10-CM

## 2023-02-03 LAB
GLUCOSE URINE, POC: NORMAL
PROTEIN UA: NEGATIVE

## 2023-02-03 PROCEDURE — 76818 FETAL BIOPHYS PROFILE W/NST: CPT | Performed by: OBSTETRICS & GYNECOLOGY

## 2023-02-03 PROCEDURE — 99213 OFFICE O/P EST LOW 20 MIN: CPT | Performed by: OBSTETRICS & GYNECOLOGY

## 2023-02-03 PROCEDURE — 76820 UMBILICAL ARTERY ECHO: CPT | Performed by: OBSTETRICS & GYNECOLOGY

## 2023-02-03 PROCEDURE — 81002 URINALYSIS NONAUTO W/O SCOPE: CPT | Performed by: OBSTETRICS & GYNECOLOGY

## 2023-02-03 NOTE — PROGRESS NOTES
Patient is here today for bpp/nst. Denies any vaginal bleeding, cramping, or leakage of fluids. Patient reports good fetal movement. Patient asked to wait to have blood pressure taken.

## 2023-02-03 NOTE — PROGRESS NOTES
2/3/23    Rosalba Rodriguez MD  [facility], [address] [place] [address]                RE:  Enid Faith  : 1985   AGE: 30 y.o. This report has been created using voice recognition software. It may contain errors which are inherent in voice recognition technology. Dear Dr. Steve Lee:     Siria Deal had an appointment today for the following indications:     Patient Active Problem List   Diagnosis    History of depression    AMA (advanced maternal age) primigravida 33+, third trimester    Elevated blood pressure affecting pregnancy in third trimester, antepartum    PIH (pregnancy induced hypertension), antepartum     Siria Deal is a 30 y.o. female, who is G1(0). She has an Estimated Date of Delivery: 3/8/23 based on her established dates. She is currently 35 weeks 2 days gestation based on that assessment. The patient was recently admitted for evaluation and management secondary to elevated blood pressures. She states that her blood pressures have been elevated intermittently throughout her pregnancy. She is currently taking labetalol 100 mg every 8 hours for management of her blood pressures. The patient's blood pressure today was 131/87. The patient had no proteinuria. She had no symptomatology related to hypertension. The patient states that her fetal movement has been good. She has had no vaginal bleeding or leaking of amniotic fluid. She had no complaint of abdominal pain or tenderness. The nonstress test was reactive, with moderate variability and accelerations. A fetal ultrasound evaluation was performed on 2023. A detailed report included in the EMR under the imaging tab. A living mayo intrauterine fetus was identified in the cephalic presentation, with normal fetal heart motion and normal fetal motion noted. The amniotic fluid index was 12.4 cm. The placenta was anterior.   The estimated fetal weight was at the 48th growth percentile for gestational age. No apparent gross fetal anatomic abnormalities were identified nearly visualized, however, visualization of the fetal anatomy was somewhat limited secondary to the advanced gestational age of the fetus in the fetal position. The biophysical profile and cord Doppler studies were both reassuring. There was no evidence of absence, or reversal of end-diastolic flow in the samples. A fetal ultrasound evaluation was performed on 1/20/2023. A detailed report included in the EMR under the imaging tab. A living mayo intrauterine fetus was identified in the cephalic presentation, with normal fetal heart motion and normal fetal motion noted. The amniotic fluid index was 9.1 cm. The placenta was anterior. The biophysical profile and cord Doppler studies were both reassuring. There was no evidence of absence, or reversal of end-diastolic flow in the samples obtained. A fetal ultrasound evaluation was performed on 1/27/2023. A detailed report included in the EMR under the imaging tab. A living mayo intrauterine fetus was identified in the cephalic presentation, with normal fetal heart motion and normal fetal motion noted. The amniotic fluid index was 13 cm. The placenta was anterior. The biophysical profile and cord Doppler studies were both reassuring. There was no evidence of absence, or reversal of end-diastolic flow in the samples obtained. A fetal ultrasound evaluation was performed on 2/3/2023. A detailed report included in the EMR under the imaging tab. A living mayo intrauterine fetus was identified in the cephalic presentation, with normal fetal heart motion and normal fetal motion noted. The amniotic fluid index was 9.5 cm. The placenta was anterior. The biophysical profile and cord Doppler studies were both reassuring. There was no evidence of absence, or reversal of end-diastolic flow in the samples obtained.                  GENETIC SCREENING/TERATOLOGY COUNSELING              (Includes patient, FTB, and any affected family members)     Patient Age > 35 Years YES   Thalassemia ( MVC<80) NO   Congential Heart Defect NO   Neural Tube Defect NO   Eleno-Sachs NO   Sickle Cell Disease NO   Sickle Cell Trait NO   Sickle C Disease or Trait NO   Hemophilia NO   Muscular Dystrophy NO   Cystic Fibrosis NO   [place] Disease NO   Autism NO   Mental Retardation NO   History of Fragile X NO   Maternal Diabetes NO   Other Genetic Disease or Syndrome NO   Previous Child With Congenital Abnormality Not Listed NO   Recreational Drugs NO                                                                  INFECTION HISTORY         HEPATITIS IMMUNIZED:  YES   HEPATITIS INFECTION:  NO   EXPOSURE TO TB NO   GENITAL HERPES NO   PARVOVIRUS B-19 NO   CHICKEN POX  NO   MEASLES NO   HIV NO      OB History    Para Term  AB Living   2             SAB IAB Ectopic Molar Multiple Live Births                      # Outcome Date GA Lbr Benjamin/2nd Weight Sex Delivery Anes PTL Lv   2 Current                     1                            PAST GYNECOLOGICAL  HISTORY:  Negative for abnormal pap smears. Negative for sexually transmitted diseases. Negative for cervical LEEP / conization /cryosurgery. Negative for uterine surgery. Negative for ovarian or tubal surgery. Past Medical History:   Diagnosis Date    Alcohol dependency ([facility]      Anxiety and depression      Psychiatric problem              Past Surgical History   History reviewed. No pertinent surgical history.        No Known Allergies    Current Outpatient Medications   Medication Sig Dispense Refill    labetalol (NORMODYNE) 100 MG tablet Take 1 tablet by mouth in the morning, at noon, and at bedtime 60 tablet 3    Prenatal MV-Min-Fe Fum-FA-DHA (PRENATAL 1 PO) Take by mouth      labetalol (NORMODYNE) 100 MG tablet Take 100 mg by mouth 2 times daily      NIFEdipine (PROCARDIA XL) 30 MG extended release tablet Take 1 tablet by mouth daily (Patient not taking: Reported on 1/27/2023) 90 tablet 1     No current facility-administered medications for this visit. Social History      Tobacco Use    Smoking status: Never    Smokeless tobacco: Never   Substance Use Topics    Alcohol use: Yes       Comment: liter of vodka daily      FAMILY MEDICAL HISTORY:   Negative for congenital abnormalities, autism, genetic disease and mental retardation, not listed above. Review of Systems :   CONSTITUTIONAL : No fever, no chills   HEENT : No headache, no visual changes, no rhinorrhea, no sore throat   CARDIOVASCULAR : No pain, no palpitations, no edema   RESPIRATORY : No pain, no shortness of breath   GASTROINTESTINAL : No N/V, no D/C, no abdominal pain   GENITOURINARY : No dysuria, hematuria and no incontinence   MUSCULOSKELETAL : No myalgia, No back pain  NEUROLOGICAL : No numbness, no tingling, no tremors. No history of seizures  ALL OTHER SYSTEMS WERE REPORTED AS NEGATIVE. PERTINENT PHYSICAL EXAMINATION:   /87   Pulse 84   Wt 132 lb (59.9 kg)   LMP 06/01/2022   BMI 25.78 kg/m²   Negative Glucose  Negative Protein    GENERAL:   The patient is a well developed, female who is alert cooperative and oriented times three in no acute distress. HEENT:  Normo cephalic and atraumatic. No facial edema. ABDOMEN:   Her uterus is gravid. She had no complaint of abdominal pain or tenderness. The fetal heart rate is 133 bpm. The fetus was in the cephalic presentation which was confirmed by the ultrasound assessment. EXTREMITIES:  No peripheral edema is noted. PELVIC EXAMINATION:  Not repeated at this time since the patient was not having contraction activity or abdominal discomfort. IMPRESSION:  1.  IUP at 35 weeks 2 days Estimated Date of Delivery: 3/8/23  2. Elevated blood pressure in the third trimester, taking labetalol 100 mg 3 times daily. 3.  Advanced maternal age  3.   The patient has a history of white coat syndrome  5. She stated that she took propranolol in the past  6. COVID 19 infection in the first trimester  7. Reassuring biophysical profile and cord Doppler testing 2/3/2023  8. Reactive nonstress test 2/3/2023     PLAN:  I would recommend that the patient count fetal movements and call if she notices any subjective decrease in fetal movements, particularly if there are less than 10 major movements in an hour. Non-stress testing should be performed every 3 to 4 days through the balance of the pregnancy. Serial ultrasounds to assess fetal anatomy and growth should be performed. The patient is at increase risk for  morbidity and mortality secondary to her history. Weekly BPP and cord Doppler testing should be performed, unless there is a clinical indication to perform the testing more frequently. She was advised to check her blood pressures 3 times a day. She is to call if her blood pressure exceeds 140/90, or if she has any new clinical symptomatology, including but not limited to, headache, change in vision, shortness of breath, chest pain, abdominal pain or any new clinically significant symptomatology. The patient was advised to call if she has any increased vaginal discharge, vaginal bleeding, contractions, abdominal pain, back pain or any new significant symptomatology prior to her next visit. I advised her that these are signs and symptoms of cervical change and require follow-up assessment when they occur. The patient is to return for follow-up assessment in 1 week unless she has a clinical indication return prior to that time. Further evaluation and management will be dependent on the results of the patient's testing and her clinical presentation. The patient is to continue to follow with you in your office for ongoing prenatal care.     The total time in minutes spent with the patient, reviewing medical records, reviewing imaging studies, performing ultrasonic imaging, reviewing laboratory testing, and documenting information was 21 minutes, of which, 50% of the time was spent in patient education, counseling, and coordinating care with the patient, her provider, and/or her family. Available paper and electronic medical records were reviewed. The patient's records from her recent admission were reviewed. I discussed with the patient the results of her fetal ultrasound evaluation and fetal testing performed today. I reviewed with her my recommendation for home blood sugar monitoring and the values at which to call or return to the hospital for further evaluation and management. I answered all of her questions to her satisfaction. If you have any questions regarding her management, please contact me at your convenience and thank you for allowing me to participate in her care.      Sincerely,           Levi Singh MD, MS, Shey Osuna RDCS, RDMS, RVT  Director [address] [place] [address]  [phone]

## 2023-02-26 ENCOUNTER — APPOINTMENT (OUTPATIENT)
Dept: LABOR AND DELIVERY | Age: 38
End: 2023-02-26
Payer: COMMERCIAL

## 2023-02-26 ENCOUNTER — ANESTHESIA EVENT (OUTPATIENT)
Dept: LABOR AND DELIVERY | Age: 38
End: 2023-02-26
Payer: COMMERCIAL

## 2023-02-26 ENCOUNTER — HOSPITAL ENCOUNTER (INPATIENT)
Age: 38
LOS: 4 days | Discharge: HOME | End: 2023-03-02
Attending: OBSTETRICS & GYNECOLOGY | Admitting: OBSTETRICS & GYNECOLOGY
Payer: COMMERCIAL

## 2023-02-26 ENCOUNTER — ANESTHESIA (OUTPATIENT)
Dept: LABOR AND DELIVERY | Age: 38
End: 2023-02-26
Payer: COMMERCIAL

## 2023-02-26 PROBLEM — Z34.93 NORMAL PREGNANCY IN THIRD TRIMESTER: Status: ACTIVE | Noted: 2023-02-26

## 2023-02-26 LAB
ABO/RH: NORMAL
AMPHETAMINE SCREEN, URINE: NOT DETECTED
ANTIBODY SCREEN: NORMAL
BARBITURATE SCREEN URINE: NOT DETECTED
BENZODIAZEPINE SCREEN, URINE: NOT DETECTED
CANNABINOID SCREEN URINE: NOT DETECTED
COCAINE METABOLITE SCREEN URINE: NOT DETECTED
FENTANYL SCREEN, URINE: NOT DETECTED
HCT VFR BLD CALC: 35.8 % (ref 34–48)
HEMOGLOBIN: 12 G/DL (ref 11.5–15.5)
Lab: NORMAL
MCH RBC QN AUTO: 30.6 PG (ref 26–35)
MCHC RBC AUTO-ENTMCNC: 33.5 % (ref 32–34.5)
MCV RBC AUTO: 91.3 FL (ref 80–99.9)
METHADONE SCREEN, URINE: NOT DETECTED
OPIATE SCREEN URINE: NOT DETECTED
OXYCODONE URINE: NOT DETECTED
PDW BLD-RTO: 12.8 FL (ref 11.5–15)
PHENCYCLIDINE SCREEN URINE: NOT DETECTED
PLATELET # BLD: 241 E9/L (ref 130–450)
PMV BLD AUTO: 9.5 FL (ref 7–12)
RBC # BLD: 3.92 E12/L (ref 3.5–5.5)
WBC # BLD: 10.7 E9/L (ref 4.5–11.5)

## 2023-02-26 PROCEDURE — 1220000001 HC SEMI PRIVATE L&D R&B

## 2023-02-26 PROCEDURE — 86900 BLOOD TYPING SEROLOGIC ABO: CPT

## 2023-02-26 PROCEDURE — 85027 COMPLETE CBC AUTOMATED: CPT

## 2023-02-26 PROCEDURE — 3E0DXGC INTRODUCTION OF OTHER THERAPEUTIC SUBSTANCE INTO MOUTH AND PHARYNX, EXTERNAL APPROACH: ICD-10-PCS | Performed by: STUDENT IN AN ORGANIZED HEALTH CARE EDUCATION/TRAINING PROGRAM

## 2023-02-26 PROCEDURE — 87491 CHLMYD TRACH DNA AMP PROBE: CPT

## 2023-02-26 PROCEDURE — 86850 RBC ANTIBODY SCREEN: CPT

## 2023-02-26 PROCEDURE — 86901 BLOOD TYPING SEROLOGIC RH(D): CPT

## 2023-02-26 PROCEDURE — APPNB30 APP NON BILLABLE TIME 0-30 MINS: Performed by: NURSE PRACTITIONER

## 2023-02-26 PROCEDURE — 87591 N.GONORRHOEAE DNA AMP PROB: CPT

## 2023-02-26 PROCEDURE — 80307 DRUG TEST PRSMV CHEM ANLYZR: CPT

## 2023-02-26 PROCEDURE — 6370000000 HC RX 637 (ALT 250 FOR IP): Performed by: OBSTETRICS & GYNECOLOGY

## 2023-02-26 PROCEDURE — 36415 COLL VENOUS BLD VENIPUNCTURE: CPT

## 2023-02-26 RX ORDER — LIDOCAINE HYDROCHLORIDE 10 MG/ML
INJECTION, SOLUTION INFILTRATION; PERINEURAL
Status: DISPENSED
Start: 2023-02-26 | End: 2023-02-27

## 2023-02-26 RX ORDER — BUTORPHANOL TARTRATE 1 MG/ML
1 INJECTION, SOLUTION INTRAMUSCULAR; INTRAVENOUS
Status: DISCONTINUED | OUTPATIENT
Start: 2023-02-26 | End: 2023-03-02 | Stop reason: HOSPADM

## 2023-02-26 RX ORDER — ACETAMINOPHEN 650 MG
TABLET, EXTENDED RELEASE ORAL
Status: DISPENSED
Start: 2023-02-26 | End: 2023-02-27

## 2023-02-26 RX ORDER — SODIUM CHLORIDE, SODIUM LACTATE, POTASSIUM CHLORIDE, CALCIUM CHLORIDE 600; 310; 30; 20 MG/100ML; MG/100ML; MG/100ML; MG/100ML
INJECTION, SOLUTION INTRAVENOUS CONTINUOUS
Status: DISCONTINUED | OUTPATIENT
Start: 2023-02-26 | End: 2023-02-28 | Stop reason: SDUPTHER

## 2023-02-26 RX ORDER — SODIUM CHLORIDE 0.9 % (FLUSH) 0.9 %
5-40 SYRINGE (ML) INJECTION PRN
Status: DISCONTINUED | OUTPATIENT
Start: 2023-02-26 | End: 2023-03-02 | Stop reason: HOSPADM

## 2023-02-26 RX ORDER — ONDANSETRON 2 MG/ML
4 INJECTION INTRAMUSCULAR; INTRAVENOUS EVERY 6 HOURS PRN
Status: DISCONTINUED | OUTPATIENT
Start: 2023-02-26 | End: 2023-03-02 | Stop reason: HOSPADM

## 2023-02-26 RX ADMIN — Medication 25 MCG: at 22:50

## 2023-02-27 PROCEDURE — 6370000000 HC RX 637 (ALT 250 FOR IP)

## 2023-02-27 PROCEDURE — 1220000001 HC SEMI PRIVATE L&D R&B

## 2023-02-27 PROCEDURE — 2580000003 HC RX 258: Performed by: OBSTETRICS & GYNECOLOGY

## 2023-02-27 PROCEDURE — 6360000002 HC RX W HCPCS: Performed by: OBSTETRICS & GYNECOLOGY

## 2023-02-27 PROCEDURE — 6370000000 HC RX 637 (ALT 250 FOR IP): Performed by: OBSTETRICS & GYNECOLOGY

## 2023-02-27 RX ORDER — ACETAMINOPHEN 325 MG/1
TABLET ORAL
Status: COMPLETED
Start: 2023-02-27 | End: 2023-02-27

## 2023-02-27 RX ORDER — ACETAMINOPHEN 325 MG/1
650 TABLET ORAL EVERY 4 HOURS PRN
Status: DISCONTINUED | OUTPATIENT
Start: 2023-02-27 | End: 2023-03-02 | Stop reason: HOSPADM

## 2023-02-27 RX ORDER — LABETALOL 100 MG/1
100 TABLET, FILM COATED ORAL EVERY 12 HOURS SCHEDULED
Status: DISCONTINUED | OUTPATIENT
Start: 2023-02-27 | End: 2023-02-27

## 2023-02-27 RX ORDER — LABETALOL 100 MG/1
TABLET, FILM COATED ORAL
Status: COMPLETED
Start: 2023-02-27 | End: 2023-02-27

## 2023-02-27 RX ORDER — LABETALOL 100 MG/1
100 TABLET, FILM COATED ORAL EVERY 8 HOURS SCHEDULED
Status: DISCONTINUED | OUTPATIENT
Start: 2023-02-27 | End: 2023-03-02 | Stop reason: HOSPADM

## 2023-02-27 RX ORDER — ZOLPIDEM TARTRATE 5 MG/1
5 TABLET ORAL ONCE
Status: COMPLETED | OUTPATIENT
Start: 2023-02-27 | End: 2023-02-27

## 2023-02-27 RX ADMIN — Medication 1 MILLI-UNITS/MIN: at 14:03

## 2023-02-27 RX ADMIN — Medication 25 MCG: at 07:00

## 2023-02-27 RX ADMIN — ACETAMINOPHEN 650 MG: 325 TABLET ORAL at 17:34

## 2023-02-27 RX ADMIN — LABETALOL 100 MG: 100 TABLET, FILM COATED ORAL at 07:41

## 2023-02-27 RX ADMIN — Medication 25 MCG: at 21:00

## 2023-02-27 RX ADMIN — ZOLPIDEM TARTRATE 5 MG: 5 TABLET ORAL at 20:59

## 2023-02-27 RX ADMIN — ACETAMINOPHEN 650 MG: 325 TABLET ORAL at 03:12

## 2023-02-27 RX ADMIN — LABETALOL HYDROCHLORIDE 100 MG: 100 TABLET, FILM COATED ORAL at 21:00

## 2023-02-27 RX ADMIN — ACETAMINOPHEN 650 MG: 325 TABLET ORAL at 07:42

## 2023-02-27 RX ADMIN — LABETALOL HYDROCHLORIDE 100 MG: 100 TABLET, FILM COATED ORAL at 07:41

## 2023-02-27 RX ADMIN — SODIUM CHLORIDE, POTASSIUM CHLORIDE, SODIUM LACTATE AND CALCIUM CHLORIDE: 600; 310; 30; 20 INJECTION, SOLUTION INTRAVENOUS at 17:34

## 2023-02-27 RX ADMIN — Medication 25 MCG: at 03:00

## 2023-02-27 ASSESSMENT — PAIN DESCRIPTION - LOCATION
LOCATION: ABDOMEN
LOCATION: ABDOMEN

## 2023-02-27 ASSESSMENT — PAIN DESCRIPTION - DESCRIPTORS
DESCRIPTORS: CRAMPING

## 2023-02-27 ASSESSMENT — PAIN SCALES - GENERAL
PAINLEVEL_OUTOF10: 4
PAINLEVEL_OUTOF10: 5

## 2023-02-27 ASSESSMENT — PAIN - FUNCTIONAL ASSESSMENT: PAIN_FUNCTIONAL_ASSESSMENT: ACTIVITIES ARE NOT PREVENTED

## 2023-02-27 ASSESSMENT — PAIN DESCRIPTION - ORIENTATION: ORIENTATION: LOWER

## 2023-02-27 NOTE — H&P
Department of Obstetrics and Gynecology  Labor and Delivery  History & Physical    Patient:  Mireille Noriega     Admit Date:  2023  9:54 PM  Medical Record Number:  [id number]    CHIEF COMPLAINT:  IOL    PROBLEM LIST:     Patient Active Problem List   Diagnosis    MDD (major depressive disorder), [facility]    Gestational hypertension affecting third pregnancy    AMA (advanced maternal age) primigravida 35+, third trimester    Elevated blood pressure affecting pregnancy in third trimester, antepartum    PIH (pregnancy induced hypertension), antepartum           HISTORY OF PRESENT ILLNESS:    The patient is a 30 y.o.  female  at 38w3d. Patient presents with a chief complaint as above   ESTIMATED DUE DATE: Estimated Date of Delivery: 3/8/23    PRENATAL CARE:  Complicated by: hx of hypertension, white coat syndrome       Past OB History  OB History          1    Para        Term                AB        Living             SAB        IAB        Ectopic        Molar        Multiple        Live Births                    Past Medical History:        Diagnosis Date    Alcohol dependency ([facility]     Anxiety and depression     Hypertension     Psychiatric problem        Past Surgical History:        Procedure Laterality Date    WISDOM TOOTH EXTRACTION         Allergies:  Patient has no known allergies.     Social History:    Social History     Socioeconomic History    Marital status:      Spouse name: Not on file    Number of children: 0    Years of education: Not on file    Highest education level: Not on file   Occupational History    Occupation: physician assistant   Tobacco Use    Smoking status: Never    Smokeless tobacco: Never   Vaping Use    Vaping Use: Never used   Substance and Sexual Activity    Alcohol use: Not Currently     Comment: liter of vodka daily    Drug use: No    Sexual activity: Yes     Partners: Male   Other Topics Concern    Not on file   Social History Narrative    Not on file     Social Determinants of Health     Financial Resource Strain: Not on file   Food Insecurity: Not on file   Transportation Needs: Not on file   Physical Activity: Not on file   Stress: Not on file   Social Connections: Not on file   Intimate Partner Violence: Not on file   Housing Stability: Not on file       Family History:       Problem Relation Age of Onset    Depression Mother     Anxiety Disorder Mother     Depression Maternal Aunt     Anxiety Disorder Maternal Aunt        Medications Prior to Admission:  Medications Prior to Admission: labetalol (NORMODYNE) 100 MG tablet, Take 1 tablet by mouth in the morning, at noon, and at bedtime  labetalol (NORMODYNE) 100 MG tablet, Take 100 mg by mouth 2 times daily  Prenatal MV-Min-Fe Fum-FA-DHA (PRENATAL 1 PO), Take by mouth  NIFEdipine (PROCARDIA XL) 30 MG extended release tablet, Take 1 tablet by mouth daily (Patient not taking: Reported on 1/27/2023)    REVIEW OF SYSTEMS:  CONSTITUTIONAL:  negative  RESPIRATORY:  negative  CARDIOVASCULAR:  negative  GASTROINTESTINAL:  negative  ALLERGIC/IMMUNOLOGIC:  negative  NEUROLOGICAL:  negative  BEHAVIOR/PSYCH:  negative    PHYSICAL EXAM:  Vitals:    02/26/23 2213   BP: (!) 149/96   Pulse: 65   Resp: 16   Temp: 98.4 °F (36.9 °C)   TempSrc: Oral   Weight: 134 lb (60.8 kg)   Height: 5' (1.524 m)     General appearance:  awake, alert, cooperative, no apparent distress, and appears stated age  Neurologic:  Awake, alert, oriented to name, place and time.   Skin: warm, dry, normal color, no rashes  Head:  NC,AT,NT  Eyes:  Sclerae white, pupils equal and reactive, red reflex normal bilaterally  Ears:  Well-positioned, well-formed pinnae; Hearing: intact  Nose:  Clear, normal mucosa  Throat:  Lips, tongue and mucosa are pink, moist and intact; no exudate  Neck:  Supple, symmetrical  Heart:  Regular rate & rhythm, S1 S2, no murmurs, rubs, or gallops  Lungs:  No increased work of breathing, good air exchange, CTA b/l. Abdomen:  Soft, non tender, gravid, consistent with her gestational age  Extremities: No clubbing, cyanosis, cords; No calf tenderness; Edema: trace  Pulses:  Strong equal distal pulses, brisk capillary refill  Fetal heart rate:  Reassuring. Pelvis:  Adequate pelvis  Cervix: rn to check cervix  Contraction frequency:  5 minutes-6 minutes  Membranes:  Intact    Labs:  No results found for this or any previous visit (from the past 72 hour(s)).     ASSESSMENT:  30 y.o.  female at 38w3d     Admit-IOL  Patient Active Problem List   Diagnosis    MDD (major depressive disorder), recurrent severe, without psychosis ([facility]    Gestational hypertension affecting third pregnancy    AMA (advanced maternal age) primigravida 35+, third trimester    Elevated blood pressure affecting pregnancy in third trimester, antepartum    PIH (pregnancy induced hypertension), antepartum     Fetus: Reassuring  GBS: negative    PLAN:  Admit  Routine admission orders  Cytotec Induction  Stadol prn, nubain if unavailable    CHIRAG Fleming - CNP  2023 10:25 PM

## 2023-02-27 NOTE — ANESTHESIA PRE PROCEDURE
Department of Anesthesiology  Preprocedure Note       Name:  Luca Duenas   Age:  30 y.o.  :  1985                                          MRN:  [id number]         Date:  2023      Surgeon: * No surgeons listed *    Procedure: * No procedures listed *    Medications prior to admission:   Prior to Admission medications    Medication Sig Start Date End Date Taking?  Authorizing Provider   labetalol (NORMODYNE) 100 MG tablet Take 1 tablet by mouth in the morning, at noon, and at bedtime 23  CHIRAG Vernon CNM   labetalol (NORMODYNE) 100 MG tablet Take 100 mg by mouth 2 times daily    Historical Provider, MD   Prenatal MV-Min-Fe Fum-FA-DHA (PRENATAL 1 PO) Take by mouth    Historical Provider, MD   NIFEdipine (PROCARDIA XL) 30 MG extended release tablet Take 1 tablet by mouth daily  Patient not taking: Reported on 2023   CHIRAG Jones - CNP       Current medications:    Current Facility-Administered Medications   Medication Dose Route Frequency Provider Last Rate Last Admin    lactated ringers IV soln infusion   IntraVENous Continuous Yasmani Gorman MD        sodium chloride flush 0.9 % injection 5-40 mL  5-40 mL IntraVENous PRN Yasmani Gorman MD        ondansetron [facility]) injection 4 mg  4 mg IntraVENous Q6H PRN Yasmani Gorman MD        butorphanol (STADOL) injection 1 mg  1 mg IntraVENous Q3H PRN Yasmani Gorman MD        miSOPROStol (CYTOTEC) pre-split tablet TABS 25 mcg  25 mcg Vaginal Q4H Yasmani Gorman MD   25 mcg at 23 2250    povidone-iodine (BETADINE) 10 % external solution             lidocaine 1 % injection                Allergies:  No Known Allergies    Problem List:    Patient Active Problem List   Diagnosis Code    MD[facility]major [facility] disorder[facility] F33.2    Gestational hypertension affecting third pregnancy O13.9    AMA (advanced maternal age) primigravida 35+, third trimester O09.513    Elevated blood pressure affecting pregnancy in third trimester, antepartum O16.3    PIH (pregnancy induced hypertension), antepartum O13.9    Normal pregnancy in third trimester Z34.93       Past Medical History:        Diagnosis Date    [facility] dependency ([facility]     Anxiety and depression     Hypertension     Psychiatric problem        Past Surgical History:        Procedure Laterality Date    WISDOM TOOTH EXTRACTION         Social History:    Social History     Tobacco Use    Smoking status: Never    Smokeless tobacco: Never   Substance Use Topics    Alcohol use: Not Currently     Comment: liter of vodka daily                                Counseling given: Not Answered      Vital Signs (Current):   Vitals:    02/26/23 2213 02/26/23 2215 02/26/23 2302   BP: (!) 149/96 (!) 149/96 125/78   Pulse: 65 65 66   Resp: 16     Temp: 36.9 °C (98.4 °F)     TempSrc: Oral     Weight: 134 lb (60.8 kg)     Height: 5' (1.524 m)                                                BP Readings from Last 3 Encounters:   02/26/23 125/78   02/03/23 131/87   01/27/23 127/84       NPO Status:                                                                                 BMI:   Wt Readings from Last 3 Encounters:   02/26/23 134 lb (60.8 kg)   02/03/23 132 lb (59.9 kg)   01/27/23 129 lb (58.5 kg)     Body mass index is 26.17 kg/m².     CBC:   Lab Results   Component Value Date/Time    WBC 10.7 02/26/2023 10:40 PM    RBC 3.92 02/26/2023 10:40 PM    HGB 12.0 02/26/2023 10:40 PM    HCT 35.8 02/26/2023 10:40 PM    MCV 91.3 02/26/2023 10:40 PM    RDW 12.8 02/26/2023 10:40 PM     02/26/2023 10:40 PM       CMP:   Lab Results   Component Value Date/Time     01/27/2023 05:20 PM    K 4.2 01/27/2023 05:20 PM     01/27/2023 05:20 PM    CO2 20 01/27/2023 05:20 PM    BUN 10 01/27/2023 05:20 PM    CREATININE 0.5 01/27/2023 05:20 PM    GFRAA >60 10/02/2018 06:51 PM    LABGLOM >60 01/27/2023 05:20 PM    GLUCOSE 77 01/27/2023 05:20 PM    PROT 6.3 01/27/2023 05:20 PM    CALCIUM 9.4 01/27/2023 05:20 PM    BILITOT 0.2 01/27/2023 05:20 PM    ALKPHOS 90 01/27/2023 05:20 PM    AST 13 01/27/2023 05:20 PM    ALT 13 01/27/2023 05:20 PM       POC Tests: No results for input(s): POCGLU, POCNA, POCK, POCCL, POCBUN, POCHEMO, POCHCT in the last 72 hours. Coags:   Lab Results   Component Value Date/Time    PROTIME 10.0 01/27/2023 05:20 PM    INR 0.9 01/27/2023 05:20 PM    APTT 25.5 01/27/2023 05:20 PM       HCG (If Applicable):   Lab Results   Component Value Date    PREGTESTUR NEGATIVE 10/02/2018        ABGs: No results found for: PHART, PO2ART, VXJ6YMN, MMX5MYW, BEART, D3DKZIQP     Type & Screen (If Applicable):  No results found for: LABABO, LABRH    Drug/Infectious Status (If Applicable):  No results found for: HIV, HEPCAB    COVID-19 Screening (If Applicable): No results found for: COVID19        Anesthesia Evaluation  Patient summary reviewed no history of anesthetic complications (denies self and family hx): Airway: Mallampati: II  TM distance: >3 FB   Neck ROM: full  Mouth opening: > = 3 FB   Dental:          Pulmonary:Negative Pulmonary ROS breath sounds clear to auscultation            Patient did not smoke on day of surgery. Cardiovascular:    (+) hypertension (on labetolol 100mg tid started on 3rd trimester bp 116/69):,         Rhythm: regular  Rate: normal                    Neuro/Psych:   (+) psychiatric history: stable without treatmentdepression/anxiety             GI/Hepatic/Renal:             Endo/Other: Negative Endo/Other ROS                    Abdominal:             Vascular: Other Findings:           Anesthesia Plan      general, epidural and spinal     ASA 2     (Pt npo solids 2030/ risks and benefits discussed agree to proceed with epidural)        Anesthetic plan and risks discussed with patient (firas). Use of blood products discussed with patient whom consented to blood products. Plan discussed with attending.                     Dayana Stewart, APRN - CRNA   2/26/2023

## 2023-02-27 NOTE — PROGRESS NOTES
Dr Keisha Hernadez updated SVE remains closed. Pt is not jack regularly.  Will place one more cytotec and then start Pitocin

## 2023-02-28 LAB
AMNISURE, POC: POSITIVE
Lab: NORMAL
NEGATIVE QC PASS/FAIL: NORMAL
POSITIVE QC PASS/FAIL: NORMAL

## 2023-02-28 PROCEDURE — 1220000001 HC SEMI PRIVATE L&D R&B

## 2023-02-28 PROCEDURE — 10D17Z9 MANUAL EXTRACTION OF PRODUCTS OF CONCEPTION, RETAINED, VIA NATURAL OR ARTIFICIAL OPENING: ICD-10-PCS | Performed by: OBSTETRICS & GYNECOLOGY

## 2023-02-28 PROCEDURE — 6360000002 HC RX W HCPCS: Performed by: ANESTHESIOLOGY

## 2023-02-28 PROCEDURE — 3700000000 HC ANESTHESIA ATTENDED CARE: Performed by: OBSTETRICS & GYNECOLOGY

## 2023-02-28 PROCEDURE — 2580000003 HC RX 258

## 2023-02-28 PROCEDURE — 7100000000 HC PACU RECOVERY - FIRST 15 MIN: Performed by: OBSTETRICS & GYNECOLOGY

## 2023-02-28 PROCEDURE — 6360000002 HC RX W HCPCS

## 2023-02-28 PROCEDURE — 51701 INSERT BLADDER CATHETER: CPT

## 2023-02-28 PROCEDURE — 2500000003 HC RX 250 WO HCPCS: Performed by: ANESTHESIOLOGY

## 2023-02-28 PROCEDURE — 2580000003 HC RX 258: Performed by: OBSTETRICS & GYNECOLOGY

## 2023-02-28 PROCEDURE — 2709999900 HC NON-CHARGEABLE SUPPLY: Performed by: OBSTETRICS & GYNECOLOGY

## 2023-02-28 PROCEDURE — 3700000025 EPIDURAL BLOCK: Performed by: ANESTHESIOLOGY

## 2023-02-28 PROCEDURE — 10H07YZ INSERTION OF OTHER DEVICE INTO PRODUCTS OF CONCEPTION, VIA NATURAL OR ARTIFICIAL OPENING: ICD-10-PCS | Performed by: OBSTETRICS & GYNECOLOGY

## 2023-02-28 PROCEDURE — 3600000012 HC SURGERY LEVEL 2 ADDTL 15MIN: Performed by: OBSTETRICS & GYNECOLOGY

## 2023-02-28 PROCEDURE — 84112 EVAL AMNIOTIC FLUID PROTEIN: CPT

## 2023-02-28 PROCEDURE — 3600000002 HC SURGERY LEVEL 2 BASE: Performed by: OBSTETRICS & GYNECOLOGY

## 2023-02-28 PROCEDURE — 7200000001 HC VAGINAL DELIVERY

## 2023-02-28 PROCEDURE — 6360000002 HC RX W HCPCS: Performed by: OBSTETRICS & GYNECOLOGY

## 2023-02-28 PROCEDURE — 3700000001 HC ADD 15 MINUTES (ANESTHESIA): Performed by: OBSTETRICS & GYNECOLOGY

## 2023-02-28 PROCEDURE — 6370000000 HC RX 637 (ALT 250 FOR IP): Performed by: OBSTETRICS & GYNECOLOGY

## 2023-02-28 PROCEDURE — 7100000001 HC PACU RECOVERY - ADDTL 15 MIN: Performed by: OBSTETRICS & GYNECOLOGY

## 2023-02-28 RX ORDER — IBUPROFEN 600 MG/1
600 TABLET ORAL EVERY 6 HOURS PRN
Status: DISCONTINUED | OUTPATIENT
Start: 2023-02-28 | End: 2023-03-02 | Stop reason: HOSPADM

## 2023-02-28 RX ORDER — SODIUM CHLORIDE 9 MG/ML
INJECTION, SOLUTION INTRAVENOUS PRN
Status: DISCONTINUED | OUTPATIENT
Start: 2023-02-28 | End: 2023-03-02 | Stop reason: HOSPADM

## 2023-02-28 RX ORDER — SUCCINYLCHOLINE CHLORIDE 20 MG/ML
INJECTION INTRAMUSCULAR; INTRAVENOUS PRN
Status: DISCONTINUED | OUTPATIENT
Start: 2023-02-28 | End: 2023-02-28 | Stop reason: SDUPTHER

## 2023-02-28 RX ORDER — MODIFIED LANOLIN
OINTMENT (GRAM) TOPICAL PRN
Status: DISCONTINUED | OUTPATIENT
Start: 2023-02-28 | End: 2023-03-02 | Stop reason: HOSPADM

## 2023-02-28 RX ORDER — SODIUM CHLORIDE 0.9 % (FLUSH) 0.9 %
5-40 SYRINGE (ML) INJECTION EVERY 12 HOURS SCHEDULED
Status: DISCONTINUED | OUTPATIENT
Start: 2023-02-28 | End: 2023-03-02 | Stop reason: HOSPADM

## 2023-02-28 RX ORDER — ONDANSETRON 2 MG/ML
4 INJECTION INTRAMUSCULAR; INTRAVENOUS EVERY 6 HOURS PRN
Status: DISCONTINUED | OUTPATIENT
Start: 2023-02-28 | End: 2023-03-01 | Stop reason: HOSPADM

## 2023-02-28 RX ORDER — PROPOFOL 10 MG/ML
INJECTION, EMULSION INTRAVENOUS PRN
Status: DISCONTINUED | OUTPATIENT
Start: 2023-02-28 | End: 2023-02-28 | Stop reason: SDUPTHER

## 2023-02-28 RX ORDER — SODIUM CHLORIDE 0.9 % (FLUSH) 0.9 %
5-40 SYRINGE (ML) INJECTION PRN
Status: DISCONTINUED | OUTPATIENT
Start: 2023-02-28 | End: 2023-03-02 | Stop reason: HOSPADM

## 2023-02-28 RX ORDER — SODIUM CHLORIDE, SODIUM LACTATE, POTASSIUM CHLORIDE, CALCIUM CHLORIDE 600; 310; 30; 20 MG/100ML; MG/100ML; MG/100ML; MG/100ML
INJECTION, SOLUTION INTRAVENOUS CONTINUOUS
Status: DISCONTINUED | OUTPATIENT
Start: 2023-02-28 | End: 2023-03-02 | Stop reason: HOSPADM

## 2023-02-28 RX ORDER — DOCUSATE SODIUM 100 MG/1
100 CAPSULE, LIQUID FILLED ORAL 2 TIMES DAILY
Status: DISCONTINUED | OUTPATIENT
Start: 2023-02-28 | End: 2023-03-02 | Stop reason: HOSPADM

## 2023-02-28 RX ORDER — FERROUS SULFATE 325(65) MG
325 TABLET ORAL 2 TIMES DAILY WITH MEALS
Status: DISCONTINUED | OUTPATIENT
Start: 2023-02-28 | End: 2023-03-02 | Stop reason: HOSPADM

## 2023-02-28 RX ORDER — NALOXONE HYDROCHLORIDE 0.4 MG/ML
INJECTION, SOLUTION INTRAMUSCULAR; INTRAVENOUS; SUBCUTANEOUS PRN
Status: DISCONTINUED | OUTPATIENT
Start: 2023-02-28 | End: 2023-03-01 | Stop reason: HOSPADM

## 2023-02-28 RX ORDER — FENTANYL CITRATE 50 UG/ML
INJECTION, SOLUTION INTRAMUSCULAR; INTRAVENOUS PRN
Status: DISCONTINUED | OUTPATIENT
Start: 2023-02-28 | End: 2023-02-28 | Stop reason: SDUPTHER

## 2023-02-28 RX ORDER — OXYCODONE HYDROCHLORIDE 5 MG/1
5 TABLET ORAL EVERY 6 HOURS PRN
Status: DISCONTINUED | OUTPATIENT
Start: 2023-02-28 | End: 2023-03-02 | Stop reason: HOSPADM

## 2023-02-28 RX ORDER — SODIUM CHLORIDE, SODIUM LACTATE, POTASSIUM CHLORIDE, CALCIUM CHLORIDE 600; 310; 30; 20 MG/100ML; MG/100ML; MG/100ML; MG/100ML
INJECTION, SOLUTION INTRAVENOUS CONTINUOUS PRN
Status: DISCONTINUED | OUTPATIENT
Start: 2023-02-28 | End: 2023-02-28 | Stop reason: SDUPTHER

## 2023-02-28 RX ORDER — ACETAMINOPHEN 650 MG
TABLET, EXTENDED RELEASE ORAL
Status: DISPENSED
Start: 2023-02-28 | End: 2023-02-28

## 2023-02-28 RX ORDER — SIMETHICONE 80 MG
80 TABLET,CHEWABLE ORAL EVERY 6 HOURS PRN
Status: DISCONTINUED | OUTPATIENT
Start: 2023-02-28 | End: 2023-03-02 | Stop reason: HOSPADM

## 2023-02-28 RX ORDER — ONDANSETRON 4 MG/1
4 TABLET, ORALLY DISINTEGRATING ORAL EVERY 8 HOURS PRN
Status: DISCONTINUED | OUTPATIENT
Start: 2023-02-28 | End: 2023-03-02 | Stop reason: HOSPADM

## 2023-02-28 RX ORDER — ONDANSETRON 2 MG/ML
INJECTION INTRAMUSCULAR; INTRAVENOUS PRN
Status: DISCONTINUED | OUTPATIENT
Start: 2023-02-28 | End: 2023-02-28 | Stop reason: SDUPTHER

## 2023-02-28 RX ADMIN — PROPOFOL 130 MG: 10 INJECTION, EMULSION INTRAVENOUS at 18:34

## 2023-02-28 RX ADMIN — FENTANYL CITRATE 50 MCG: 50 INJECTION, SOLUTION INTRAMUSCULAR; INTRAVENOUS at 19:13

## 2023-02-28 RX ADMIN — Medication 25 MCG: at 01:00

## 2023-02-28 RX ADMIN — SODIUM CHLORIDE, POTASSIUM CHLORIDE, SODIUM LACTATE AND CALCIUM CHLORIDE: 600; 310; 30; 20 INJECTION, SOLUTION INTRAVENOUS at 18:27

## 2023-02-28 RX ADMIN — ONDANSETRON HYDROCHLORIDE 4 MG: 2 SOLUTION INTRAMUSCULAR; INTRAVENOUS at 18:16

## 2023-02-28 RX ADMIN — LABETALOL HYDROCHLORIDE 100 MG: 100 TABLET, FILM COATED ORAL at 06:21

## 2023-02-28 RX ADMIN — Medication 166.7 ML: at 18:44

## 2023-02-28 RX ADMIN — Medication 15 ML/HR: at 13:39

## 2023-02-28 RX ADMIN — ACETAMINOPHEN 650 MG: 325 TABLET ORAL at 07:43

## 2023-02-28 RX ADMIN — Medication: at 21:05

## 2023-02-28 RX ADMIN — ACETAMINOPHEN 650 MG: 325 TABLET ORAL at 01:04

## 2023-02-28 RX ADMIN — PHENYLEPHRINE HYDROCHLORIDE 100 MCG: 10 INJECTION INTRAVENOUS at 18:43

## 2023-02-28 RX ADMIN — ONDANSETRON 4 MG: 2 INJECTION INTRAMUSCULAR; INTRAVENOUS at 19:00

## 2023-02-28 RX ADMIN — Medication 5 ML: at 13:36

## 2023-02-28 RX ADMIN — PHENYLEPHRINE HYDROCHLORIDE 100 MCG: 10 INJECTION INTRAVENOUS at 18:53

## 2023-02-28 RX ADMIN — WATER 1000 MG: 1 INJECTION INTRAMUSCULAR; INTRAVENOUS; SUBCUTANEOUS at 21:03

## 2023-02-28 RX ADMIN — SUCCINYLCHOLINE CHLORIDE 140 MG: 20 INJECTION, SOLUTION INTRAMUSCULAR; INTRAVENOUS at 18:34

## 2023-02-28 RX ADMIN — FENTANYL CITRATE 50 MCG: 50 INJECTION, SOLUTION INTRAMUSCULAR; INTRAVENOUS at 18:51

## 2023-02-28 RX ADMIN — SODIUM CHLORIDE, POTASSIUM CHLORIDE, SODIUM LACTATE AND CALCIUM CHLORIDE: 600; 310; 30; 20 INJECTION, SOLUTION INTRAVENOUS at 19:12

## 2023-02-28 RX ADMIN — LABETALOL HYDROCHLORIDE 100 MG: 100 TABLET, FILM COATED ORAL at 21:20

## 2023-02-28 RX ADMIN — BUTORPHANOL TARTRATE 1 MG: 1 INJECTION, SOLUTION INTRAMUSCULAR; INTRAVENOUS at 08:33

## 2023-02-28 RX ADMIN — PHENYLEPHRINE HYDROCHLORIDE 200 MCG: 10 INJECTION INTRAVENOUS at 18:55

## 2023-02-28 RX ADMIN — PHENYLEPHRINE HYDROCHLORIDE 100 MCG: 10 INJECTION INTRAVENOUS at 18:45

## 2023-02-28 RX ADMIN — Medication 5 ML: at 13:37

## 2023-02-28 RX ADMIN — DOCUSATE SODIUM 100 MG: 100 CAPSULE, LIQUID FILLED ORAL at 21:05

## 2023-02-28 RX ADMIN — Medication 1 MILLI-UNITS/MIN: at 06:28

## 2023-02-28 RX ADMIN — PHENYLEPHRINE HYDROCHLORIDE 200 MCG: 10 INJECTION INTRAVENOUS at 18:58

## 2023-02-28 ASSESSMENT — PAIN - FUNCTIONAL ASSESSMENT
PAIN_FUNCTIONAL_ASSESSMENT: ACTIVITIES ARE NOT PREVENTED
PAIN_FUNCTIONAL_ASSESSMENT: ACTIVITIES ARE NOT PREVENTED

## 2023-02-28 ASSESSMENT — PAIN SCALES - GENERAL
PAINLEVEL_OUTOF10: 8
PAINLEVEL_OUTOF10: 8
PAINLEVEL_OUTOF10: 4

## 2023-02-28 ASSESSMENT — PAIN DESCRIPTION - ORIENTATION
ORIENTATION: LOWER
ORIENTATION: LOWER

## 2023-02-28 ASSESSMENT — PAIN DESCRIPTION - DESCRIPTORS
DESCRIPTORS: DISCOMFORT;CRAMPING;TIGHTNESS
DESCRIPTORS: CRAMPING;DISCOMFORT

## 2023-02-28 ASSESSMENT — PAIN DESCRIPTION - LOCATION
LOCATION: ABDOMEN
LOCATION: ABDOMEN

## 2023-02-28 NOTE — PROGRESS NOTES
L&D PROGRESS NOTE:    Patient:  Skinny Vila     Admit Date:  2023  9:54 PM  Medical Record Number:  [id number]   Today's Date: 2023    S: Dr Jeff Kaur MD requesting placement of IUPC. O:   Vitals:    23 0631 23 0702 23 0731 23 0845   BP: (!) 141/91 (!) 148/74 (!) 143/93 138/79   Pulse: 74 62 73 56   Resp:  16 18 16   Temp:   98.1 °F (36.7 °C)    TempSrc:   Oral    SpO2:       Weight:       Height:         FHR:  Reassuring. Cervix: 1 cm / 90 / soft / 0.   TOCO:  -not picking up    A:37 y.o.  female at 38w7d   Term pregnancy and Induced labor     Patient Active Problem List   Diagnosis    MDD (major depressive disorder), recurrent severe, without psychosis ([facility]    Gestational hypertension affecting third pregnancy    AMA (advanced maternal age) primigravida 35+, third trimester    Elevated blood pressure affecting pregnancy in third trimester, antepartum    PIH (pregnancy induced hypertension), antepartum    Normal pregnancy in third trimester     Fetal status: Reassuring      P:   IUPC placed posteriorly without difficulty to 45 cm line at introitus  Unable to place ie due to dilatation, spouse verbally abusive to staff  IV bolus/O2/position changes prn  See orders per Dr. Jeff Kaur MD.    Brandee Mancilla, CHIRAG - ROSELIA,   2023 10:23 AM

## 2023-02-28 NOTE — PROGRESS NOTES
Updated Dr. Chaparro Sink on sve 10/100/+1 and feeling pressure.  States he's heading in and can start pushing now

## 2023-02-28 NOTE — PROGRESS NOTES
Updated Dr. Estee Hoyos patient requesting epidural at this time. Patient does not want any more cervical exams until she is comfortable.  Ok for epidural

## 2023-02-28 NOTE — PROGRESS NOTES
Dr. Jayson Mcgraw updated that pt SROM at 0500, SVE 1/70/0. New orders received to start pitocin now.

## 2023-02-28 NOTE — PROGRESS NOTES
Updated Dr. Cruz Favorite on 5 minute prolonged deceleration around 0840 reaching 60-70bpm. Pitocin off. Variability has been minimal but stadol was given prior around 0830, sve was unchanged at the time. Patient had another deceleration around 0908.  New orders for internal monitors

## 2023-02-28 NOTE — ANESTHESIA PROCEDURE NOTES
Epidural Block    Patient location during procedure: OB  Reason for block: labor epidural  Staffing  Performed: resident/CRNA   Resident/CRNA: CHIRAG Ogden - CRNA  Epidural  Patient position: sitting  Prep: ChloraPrep  Patient monitoring: continuous pulse ox and frequent blood pressure checks  Approach: midline  Location: L3-4  Injection technique: QUENTIN air  Provider prep: mask and sterile gloves  Needle  Needle type: Tuohy   Needle gauge: 18 G  Needle length: 3.5 in  Needle insertion depth: 5 cm  Catheter type: end hole  Catheter size: 20 G (20g)  Catheter at skin depth: 12 cm  Test dose: negativeCatheter Secured: tegaderm and tape  Assessment  Hemodynamics: stable  Attempts: 1  Outcomes: uncomplicated and patient tolerated procedure well  Preanesthetic Checklist  Completed: patient identified, IV checked, site marked, risks and benefits discussed, surgical/procedural consents, equipment checked, pre-op evaluation, timeout performed, anesthesia consent given, oxygen available and monitors applied/VS acknowledged

## 2023-02-28 NOTE — PROGRESS NOTES
Assumed care of pt. Pt denies LOF and VB, states she feels mild cramping. Pt taken off monitor and IV covered for pt to get up and shower per order.

## 2023-02-28 NOTE — PROGRESS NOTES
Updated Dr. Tish Dancer sve was unchanged at 0940 when IUPC was placed. No decelerations present after iupc placement.  Ok to restart pitocin

## 2023-03-01 LAB
C. TRACHOMATIS DNA ,URINE: NEGATIVE
HCT VFR BLD CALC: 20.5 % (ref 34–48)
HCT VFR BLD CALC: 21.7 % (ref 34–48)
HEMOGLOBIN: 6.7 G/DL (ref 11.5–15.5)
HEMOGLOBIN: 7.2 G/DL (ref 11.5–15.5)
MCH RBC QN AUTO: 31 PG (ref 26–35)
MCH RBC QN AUTO: 31.3 PG (ref 26–35)
MCHC RBC AUTO-ENTMCNC: 32.7 % (ref 32–34.5)
MCHC RBC AUTO-ENTMCNC: 33.2 % (ref 32–34.5)
MCV RBC AUTO: 93.5 FL (ref 80–99.9)
MCV RBC AUTO: 95.8 FL (ref 80–99.9)
N. GONORRHOEAE DNA, URINE: NEGATIVE
PDW BLD-RTO: 13.1 FL (ref 11.5–15)
PDW BLD-RTO: 13.2 FL (ref 11.5–15)
PLATELET # BLD: 141 E9/L (ref 130–450)
PLATELET # BLD: 141 E9/L (ref 130–450)
PMV BLD AUTO: 9.6 FL (ref 7–12)
PMV BLD AUTO: 9.7 FL (ref 7–12)
RBC # BLD: 2.14 E12/L (ref 3.5–5.5)
RBC # BLD: 2.32 E12/L (ref 3.5–5.5)
SOURCE: NORMAL
WBC # BLD: 12.9 E9/L (ref 4.5–11.5)
WBC # BLD: 17.5 E9/L (ref 4.5–11.5)

## 2023-03-01 PROCEDURE — 6360000002 HC RX W HCPCS: Performed by: OBSTETRICS & GYNECOLOGY

## 2023-03-01 PROCEDURE — 2580000003 HC RX 258: Performed by: OBSTETRICS & GYNECOLOGY

## 2023-03-01 PROCEDURE — 6370000000 HC RX 637 (ALT 250 FOR IP): Performed by: OBSTETRICS & GYNECOLOGY

## 2023-03-01 PROCEDURE — 36415 COLL VENOUS BLD VENIPUNCTURE: CPT

## 2023-03-01 PROCEDURE — 85027 COMPLETE CBC AUTOMATED: CPT

## 2023-03-01 PROCEDURE — 1220000000 HC SEMI PRIVATE OB R&B

## 2023-03-01 RX ORDER — FENTANYL CITRATE 50 UG/ML
25 INJECTION, SOLUTION INTRAMUSCULAR; INTRAVENOUS EVERY 5 MIN PRN
Status: DISCONTINUED | OUTPATIENT
Start: 2023-03-01 | End: 2023-03-01 | Stop reason: HOSPADM

## 2023-03-01 RX ORDER — SODIUM CHLORIDE 9 MG/ML
INJECTION, SOLUTION INTRAVENOUS PRN
Status: DISCONTINUED | OUTPATIENT
Start: 2023-03-01 | End: 2023-03-01 | Stop reason: HOSPADM

## 2023-03-01 RX ORDER — FENTANYL CITRATE 50 UG/ML
50 INJECTION, SOLUTION INTRAMUSCULAR; INTRAVENOUS EVERY 5 MIN PRN
Status: DISCONTINUED | OUTPATIENT
Start: 2023-03-01 | End: 2023-03-01 | Stop reason: HOSPADM

## 2023-03-01 RX ORDER — SODIUM CHLORIDE 0.9 % (FLUSH) 0.9 %
5-40 SYRINGE (ML) INJECTION EVERY 12 HOURS SCHEDULED
Status: DISCONTINUED | OUTPATIENT
Start: 2023-03-01 | End: 2023-03-01 | Stop reason: HOSPADM

## 2023-03-01 RX ORDER — PROCHLORPERAZINE EDISYLATE 5 MG/ML
5 INJECTION INTRAMUSCULAR; INTRAVENOUS
Status: DISCONTINUED | OUTPATIENT
Start: 2023-03-01 | End: 2023-03-01 | Stop reason: HOSPADM

## 2023-03-01 RX ORDER — SODIUM CHLORIDE 0.9 % (FLUSH) 0.9 %
5-40 SYRINGE (ML) INJECTION PRN
Status: DISCONTINUED | OUTPATIENT
Start: 2023-03-01 | End: 2023-03-01 | Stop reason: HOSPADM

## 2023-03-01 RX ADMIN — WATER 1000 MG: 1 INJECTION INTRAMUSCULAR; INTRAVENOUS; SUBCUTANEOUS at 08:29

## 2023-03-01 RX ADMIN — IBUPROFEN 600 MG: 600 TABLET ORAL at 02:14

## 2023-03-01 RX ADMIN — FERROUS SULFATE TAB 325 MG (65 MG ELEMENTAL FE) 325 MG: 325 (65 FE) TAB at 07:07

## 2023-03-01 RX ADMIN — SODIUM CHLORIDE, PRESERVATIVE FREE 10 ML: 5 INJECTION INTRAVENOUS at 20:49

## 2023-03-01 RX ADMIN — FERROUS SULFATE TAB 325 MG (65 MG ELEMENTAL FE) 325 MG: 325 (65 FE) TAB at 17:07

## 2023-03-01 RX ADMIN — LABETALOL HYDROCHLORIDE 100 MG: 100 TABLET, FILM COATED ORAL at 14:41

## 2023-03-01 RX ADMIN — IBUPROFEN 600 MG: 600 TABLET ORAL at 08:37

## 2023-03-01 RX ADMIN — LABETALOL HYDROCHLORIDE 100 MG: 100 TABLET, FILM COATED ORAL at 23:14

## 2023-03-01 RX ADMIN — LABETALOL HYDROCHLORIDE 100 MG: 100 TABLET, FILM COATED ORAL at 07:07

## 2023-03-01 RX ADMIN — DOCUSATE SODIUM 100 MG: 100 CAPSULE, LIQUID FILLED ORAL at 20:49

## 2023-03-01 RX ADMIN — IBUPROFEN 600 MG: 600 TABLET ORAL at 21:07

## 2023-03-01 RX ADMIN — IBUPROFEN 600 MG: 600 TABLET ORAL at 14:40

## 2023-03-01 RX ADMIN — DOCUSATE SODIUM 100 MG: 100 CAPSULE, LIQUID FILLED ORAL at 08:22

## 2023-03-01 ASSESSMENT — PAIN - FUNCTIONAL ASSESSMENT
PAIN_FUNCTIONAL_ASSESSMENT: ACTIVITIES ARE NOT PREVENTED

## 2023-03-01 ASSESSMENT — PAIN DESCRIPTION - LOCATION
LOCATION: PERINEUM
LOCATION: VAGINA
LOCATION: VAGINA

## 2023-03-01 ASSESSMENT — PAIN SCALES - GENERAL
PAINLEVEL_OUTOF10: 4
PAINLEVEL_OUTOF10: 4
PAINLEVEL_OUTOF10: 3

## 2023-03-01 ASSESSMENT — PAIN DESCRIPTION - ORIENTATION: ORIENTATION: LOWER

## 2023-03-01 ASSESSMENT — PAIN DESCRIPTION - DESCRIPTORS
DESCRIPTORS: SORE
DESCRIPTORS: DISCOMFORT
DESCRIPTORS: SORE

## 2023-03-01 NOTE — PROGRESS NOTES
Dr Jada Nieto updated on CBC results. Discussed pt vital signs. Pt is asymptomatic. New orders received.

## 2023-03-01 NOTE — FLOWSHEET NOTE
Admitted to 313. instructed on infant safety and safe sleep protocols. Epidural site satisfactory. Up and ambulating in room. steady gait. Pt.helped to bathroom  without difficulty voided and instructed on  alexsander-care. Pt.states has some dizziness ;but  tolerated going to bathroom well. instructed and is drinking fluids.

## 2023-03-01 NOTE — PROGRESS NOTES
[employer] [place] [facility] screening results were discussed with parent. Questions answered. Brochure given to parent. Advised to monitor developmental milestones and contact physician for any concerns.    Arlester Points

## 2023-03-01 NOTE — PROCEDURES
Department of Obstetrics and Gynecology  MANUAL PLACENTAL REMOVAL   Procedure Note    PreOp Dx: PostPartum Retained Placenta. PostOp Dx: Same. Procedure: Manual Removal of placenta. Anesthesia: General.    Complications: None. Findings:  Complete retained placenta, removed intact. PostProcedure Realtime Ultrasound showed a Clear Midline echo without retained tissue. Following placement of General Anesthesia, the patient was placed in lithotomy position, prepped and draped. I inserted my left hand into the vaginal vault and through the cervical canal into the uterine cavity. The placenta could be palpated adherent to the anterior uterine wall. With the distal part of my fingers, the placenta was slowly disscted from the unterine wall until it was completely freed, and then by light traction, was delivered intact. Realtime ultrasound then showed a clear midline echo. The episiotomy was then repaired. Good Hemostasis followed. She was then replaced in the supine position, and transported from the operating room to recovery.     Checo Celis MD, [facility]  Obstetrics & Gynecology

## 2023-03-01 NOTE — PROCEDURES
Department of Obstetrics and Gynecology  VAGINAL DELIVERY  Procedure Note      Gestational Status:  Term pregnancy, Induced labor, and Single fetus     Anesthesia:  Local or Epidural    Delivery Summary:      Delivery Type: normal spontaneous vaginal delivery  at term    Gender: Female infant. Weight:  6 lbs. ,  7 oz. Birth Time: 1741    Apgars: (8 - 9)    Remarks:    Second degree episiotomy. RML. Suture used for repair:  Chromic 2.0. Nuchal Cord: was not present. There was a SINGLE TRUE KNOT in the Umbilical Cord. Disposition:  Floor. Specimen:  Placenta would not detach, and after 30 minutes of cord traction and uterine massage, the patient was taken to the OR for Manual Removal of the placenta under General Anesthesia. Estimated blood loss: 350 cc. Condition:  infant stable to general nursery and mother stable    Attending Attestation: I performed the procedure.     Geralynn Goldmann, MD, [facility]

## 2023-03-01 NOTE — ANESTHESIA POSTPROCEDURE EVALUATION
Department of Anesthesiology  Postprocedure Note    Patient: Tracy Duke  MRN: [id number]  YOB: 1985  Date of evaluation: 2/28/2023      Procedure Summary     Date: 02/28/23 Room / Location: [facility] OR 01 / [facility]    Anesthesia Start: 1316 Anesthesia Stop: 1741    Procedures:       DILATATION AND CURETTAGE SUCTION (Uterus)      Labor Analgesia Diagnosis:       Pregnancy with 45 completed weeks gestation      (Pregnancy with 45 completed weeks gestation [Z3A.38])    Surgeons: Adore Flowers MD Responsible Provider: Katty Gill MD    Anesthesia Type: General ASA Status: 2          Anesthesia Type: General    Celso Phase I:      Celso Phase II:        Anesthesia Post Evaluation    Patient location during evaluation: PACU  Patient participation: complete - patient participated  Level of consciousness: awake and alert  Pain score: 2  Airway patency: patent  Nausea & Vomiting: no vomiting and no nausea  Complications: no  Cardiovascular status: hemodynamically stable  Respiratory status: spontaneous ventilation  Hydration status: stable

## 2023-03-01 NOTE — LACTATION NOTE
Mom reports baby nursed well this am, sleepy / gaggy at this time. Encouraged skin to skin and frequent attempts at breast to stimulate milk production. Instructed on normal infant behavior in the first 12-24 hours and importance of stimulating the baby frequently to eat during this time. Reviewed hand expression, and encouraged to hand express drops of colostrum when baby is sleepy. Instructed that baby may also feed 8-12 times a day- cluster feeding at times- as her milk supply is being established. Instructed on benefits of skin to skin and avoidance of pacifier / artificial nipple use until breastfeeding is well established. Educated on making sure infant has an open airway while breastfeeding and skin to skin. Instructed on hunger cues and waking techniques to try. Reviewed signs of adequate I & O; allow baby to feed ad terrance and not to limit time at breast. Breastfeeding booklet provided with review of its contents. Encouraged to call with any concerns. Mom has a breast pump for home use.

## 2023-03-02 VITALS
DIASTOLIC BLOOD PRESSURE: 95 MMHG | TEMPERATURE: 98.9 F | WEIGHT: 134 LBS | OXYGEN SATURATION: 96 % | HEIGHT: 60 IN | RESPIRATION RATE: 16 BRPM | HEART RATE: 89 BPM | SYSTOLIC BLOOD PRESSURE: 142 MMHG | BODY MASS INDEX: 26.31 KG/M2

## 2023-03-02 PROCEDURE — 6370000000 HC RX 637 (ALT 250 FOR IP): Performed by: OBSTETRICS & GYNECOLOGY

## 2023-03-02 RX ORDER — IBUPROFEN 600 MG/1
600 TABLET ORAL EVERY 6 HOURS PRN
Qty: 60 TABLET | Refills: 1 | Status: SHIPPED | OUTPATIENT
Start: 2023-03-02

## 2023-03-02 RX ORDER — FERROUS SULFATE 325(65) MG
325 TABLET ORAL 2 TIMES DAILY WITH MEALS
Qty: 60 TABLET | Refills: 1 | Status: SHIPPED | OUTPATIENT
Start: 2023-03-02

## 2023-03-02 RX ADMIN — FERROUS SULFATE TAB 325 MG (65 MG ELEMENTAL FE) 325 MG: 325 (65 FE) TAB at 20:34

## 2023-03-02 RX ADMIN — DOCUSATE SODIUM 100 MG: 100 CAPSULE, LIQUID FILLED ORAL at 09:00

## 2023-03-02 RX ADMIN — IBUPROFEN 600 MG: 600 TABLET ORAL at 17:40

## 2023-03-02 RX ADMIN — FERROUS SULFATE TAB 325 MG (65 MG ELEMENTAL FE) 325 MG: 325 (65 FE) TAB at 09:00

## 2023-03-02 RX ADMIN — LABETALOL HYDROCHLORIDE 100 MG: 100 TABLET, FILM COATED ORAL at 05:47

## 2023-03-02 RX ADMIN — ACETAMINOPHEN 650 MG: 325 TABLET ORAL at 01:56

## 2023-03-02 RX ADMIN — Medication: at 05:44

## 2023-03-02 RX ADMIN — DOCUSATE SODIUM 100 MG: 100 CAPSULE, LIQUID FILLED ORAL at 20:34

## 2023-03-02 RX ADMIN — LABETALOL HYDROCHLORIDE 100 MG: 100 TABLET, FILM COATED ORAL at 16:18

## 2023-03-02 RX ADMIN — IBUPROFEN 600 MG: 600 TABLET ORAL at 03:50

## 2023-03-02 ASSESSMENT — PAIN DESCRIPTION - DESCRIPTORS: DESCRIPTORS: ACHING;SORE;TENDER

## 2023-03-02 ASSESSMENT — PAIN SCALES - GENERAL
PAINLEVEL_OUTOF10: 6
PAINLEVEL_OUTOF10: 3
PAINLEVEL_OUTOF10: 3

## 2023-03-02 ASSESSMENT — PAIN DESCRIPTION - ORIENTATION: ORIENTATION: LOWER

## 2023-03-02 ASSESSMENT — PAIN DESCRIPTION - LOCATION: LOCATION: ABDOMEN;PERINEUM

## 2023-03-02 ASSESSMENT — PAIN - FUNCTIONAL ASSESSMENT: PAIN_FUNCTIONAL_ASSESSMENT: ACTIVITIES ARE NOT PREVENTED

## 2023-03-02 NOTE — CARE COORDINATION
SW Discharge Planning   SW received consult for \" ppd of 13\"    SW met with Sandoval Herrera and provided her with information and resources on postpartum depression. Sandoval Herrera was polite and receptive, appeared bonded to baby, and also reported that she feels that she is doing well.      Electronically signed by JENN Dey on 3/2/2023 at 11:04 AM

## 2023-03-02 NOTE — DISCHARGE INSTRUCTIONS
Tub bath in 6 weeks  You may take a shower  Driving 2-3 weeks  Sexual activity 6 weeks  Work/School  6 weeks  Walking  As tolerated  Stairs as tolerated  Lifting no heavy lifting Follow-up with your OB doctor in 1 week if  delivery or in  6 weeks for vaginal delivery unless otherwise instructed. Call office for an appointment. For breastfeeding support, you can contact our lactation specialists at [phone], [phone], or [phone]. DIET  Eat a well balanced diet focusing on foods high in fiber and protein  Drink plenty of fluids especially water. To avoid constipation you may take a mild stool softener as recommended by your doctor or midwife. ACTIVITY  Gradually increase your activity. Resume exercise regimen only after advised by your doctor or midwife. Avoid lifting anything heavier than your baby or a gallon of milk for SIX weeks. Avoid driving until your doctor or midwife has given their approval.  Quinton Ayde slowly from a lying to sitting and then a standing position. Climb stairs one at a time. Use caution when carrying your baby up and down the stairs. No sexual activity for 6 weeks or until advised by your doctor - Nothing in vagina: intercourse, tampons, or douching. Be prepared to discuss family planning at your follow-up OB visit. You may feel tired or have a lack of energy. You may continue your prenatal vitamin to replenish nutrients post delivery. Nap when baby naps to catch up on sleep. May return to work or school in 6 weeks or as directed by OB. EMOTIONS  You may feed varghese, sad, teary, & overwhelmed. Contact your OB provider if you feel you may be showing signs of postpartum depression, or have thoughts of harming yourself or your infant. If infant will not stop crying, contact another adult for help or place infant in their crib on their back and take a break. NEVER shake your infant.       BLEEDING  Vaginal bleeding will decrease in amount over the next few weeks. You will notice that as your activity increases, your flow may increase. This is your body's way of telling you, you need to take things easier and rest more often. Call your OB/ER if you are saturating more than one maxi pad in an hour. BREAST CARE  Take medications as recommended by your doctor or midwife for pain  If you develop a warm, red, tender area on your breast or develop a fever contact your OB provider. For breastfeeding moms:  If you become engorged, feeding may be more difficult or painful for 1-2 days. You may find it helpful to hand express some milk so that the infant can latch on more easily. While breastfeeding, continue to take your prenatal vitamins as directed by your doctor or midwife. Only take medications verified as safe for breastfeeding. For non-breastfeeding moms:  You may apply ice packs to your breasts over your bra for twenty minutes at a time for comfort. Avoid stimulation to your breasts, when showering allow the water to strike your back not your breasts. Wear a good fitting bra until your milk dries, such as a sports bra. INCISIONAL CARE / ROCHELLE CARE  Clean your incision in the shower with mild soap. After shower pat the incision area dry and leave open to air. If used, Steri-stipes should be removed by 2 weeks. If used, Graylin Croft should be removed by the OB in office by 1 week. If used/ordered, an abdominal binder may provide support for your incision. Use the rochelle-bottle after toileting until bleeding stops. Cleanse your perineum from front to back  If used, stitches or internal clips will dissolve in 4-6 weeks. You may use a sitz bath or soak in a clean tub as needed for comfort. Kegel exercises will help restore bladder control. SWELLING  Keep your legs elevated when sitting or lying. When wearing stocking or socks, make sure they are not too tight. WHEN TO CALL THE DOCTOR  If you have a temp of 100.4 or more.    If your bleeding has increased and you are saturating a pad in an hour. Your abdomen is tender to touch. You are passing blood clots bigger than the size of a lemon. If you are experiencing extreme weakness or dizziness. If you are having flu-like symptoms such as achy muscles or joints. There is a foul smell or a green color to your vaginal bleeding. If you have pain that cannot be relieved. You have persistent burning with urination or frequency. Call if you have concerns about your well-being. You are unable to sleep, eat, or are having thoughts of harming yourself or your baby. You have swelling, bleeding, drainage, foul odor, redness, or warmth in/around your incision or stitches. You have a red, warm, tender area in you calf.

## 2023-03-02 NOTE — PLAN OF CARE
Problem: Vaginal Birth or  Section  Goal: Fetal and maternal status remain reassuring during the birth process  Description:  Birth OB-Pregnancy care plan goal which identifies if the fetal and maternal status remain reassuring during the birth process  Outcome: Progressing     Problem: Postpartum  Goal: Experiences normal postpartum course  Description:  Postpartum OB-Pregnancy care plan goal which identifies if the mother is experiencing a normal postpartum course  Outcome: Progressing  Goal: Appropriate maternal -  bonding  Description:  Postpartum OB-Pregnancy care plan goal which identifies if the mother and  are bonding appropriately  Outcome: Progressing  Goal: Establishment of infant feeding pattern  Description:  Postpartum OB-Pregnancy care plan goal which identifies if the mother is establishing a feeding pattern with their   Outcome: Progressing  Goal: Incisions, wounds, or drain sites healing without S/S of infection  Outcome: Progressing     Problem: Discharge Planning  Goal: Discharge to home or other facility with appropriate resources  Outcome: Progressing

## 2023-03-02 NOTE — LACTATION NOTE
Mom reports baby is nursing well, cluster fed last night. Encouraged frequent feeds to establish milk supply. Reviewed benefits and safety of skin to skin. Inst on adequate I/O and importance of keeping track of diapers at home. Instructed on signs of dehydration such as infant refusing to feed, decreased wet diapers and infant becoming listless and notify provider if these occur. Reviewed with mom the importance of notifying the physician if baby looks more jaundiced. Lactation office # given if follow-up needed, as well as other helpful resources. Encouraged to call with any concerns. Support and encouragement given.

## 2023-03-03 NOTE — PROGRESS NOTES
Department of Obstetrics and Gynecology  Labor and Delivery  Attending Post Partum Progress Note      SUBJECTIVE:  No Complaints. OBJECTIVE:     Vitals:  BP (!) 142/95   Pulse 89   Temp 98.9 °F (37.2 °C) (Oral)   Resp 16   Ht 5' (1.524 m)   Wt 134 lb (60.8 kg)   LMP 06/01/2022   SpO2 96%   Breastfeeding Unknown   BMI 26.17 kg/m²     Uterus:  Firm    DATA:      CBC:   Lab Results   Component Value Date/Time    WBC 12.9 03/01/2023 09:55 PM    RBC 2.14 03/01/2023 09:55 PM    HGB 6.7 03/01/2023 09:55 PM    HCT 20.5 03/01/2023 09:55 PM    MCV 95.8 03/01/2023 09:55 PM    RDW 13.2 03/01/2023 09:55 PM     03/01/2023 09:55 PM       ASSESSMENT: PPD #2    PLAN: Home on Motrin. Office in 6 weeks.       Uriah Ott MD, [facility]

## 2023-03-03 NOTE — DISCHARGE SUMMARY
Department of Obstetrics & Gynecology  OBSTETRICAL  VAGINAL DELIVERY  DISCHARGE SUMMARY    Lance Olivo is a 30y.o. year old  female. NICOLASA: Estimated Date of Delivery: 3/8/23     Gestational Age: 38w7d    Admitted on: 2023     Admitting Diagnosis: Normal pregnancy in third trimester [Z34.93]    PAST OB HISTORY  OB History          1    Para   1    Term   1            AB        Living   1         SAB        IAB        Ectopic        Molar        Multiple   0    Live Births   1                Antepartum complications: None     Date of Delivery:   Information for the patient's :  Chanell Storm Done [[id number]]   2023      Delivery Type: Information for the patient's :  Chanell Storm Done [[id number]]   Delivery Method: Vaginal, Spontaneous     Anesthesia: Local and Epidural     Information:       GENDER:   Information for the patient's :  Chanell Storm Done [[id number]]   female    BIRTH WEIGHT:   Information for the patient's :  Chanell Urias Ala   Birth Weight: 6 lb 7 oz (2.92 kg)    APGARS:   Information for the patient's :  Chanell Storm Done [[id number]]   APGAR One: 8    Information for the patient's :  Chanell Storm Done [[id number]]   APGAR Five: 9        Intrapartum complications: Fully Retained placenta w Manual Removal under General Anesthesia about 45 min PP. Delivered By: Erin Macias MD    Placenta: manual removal    Post-Partum Hospital Course/Complications: Uneventful     Discharge Date:  3/2/23 to Home in Good Condition.     Discharge Medications:      Medication List        START taking these medications      ferrous sulfate 325 (65 Fe) MG tablet  Commonly known as: IRON 325  Take 1 tablet by mouth 2 times daily (with meals)     ibuprofen 600 MG tablet  Commonly known as: ADVIL;MOTRIN  Take 1 tablet by mouth every 6 hours as needed for Pain            CHANGE how you take these medications      labetalol 100 MG tablet  Commonly known as: Raul Mtz  What changed: Another medication with the same name was removed. Continue taking this medication, and follow the directions you see here. CONTINUE taking these medications      PRENATAL 1 PO            STOP taking these medications      NIFEdipine 30 MG extended release tablet  Commonly known as: PROCARDIA XL               Where to Get Your Medications        You can get these medications from any pharmacy    Bring a paper prescription for each of these medications  ferrous sulfate 325 (65 Fe) MG tablet  ibuprofen 600 MG tablet         Follow-up Plan:  Appointment with Andrei Schuster MD, MD in 6 weeks.     Andrei Schuster MD, [facility]  Obstetrics & Gynecology

## 2024-01-07 ENCOUNTER — APPOINTMENT (OUTPATIENT)
Dept: ULTRASOUND IMAGING | Age: 39
End: 2024-01-07
Payer: COMMERCIAL

## 2024-01-07 ENCOUNTER — HOSPITAL ENCOUNTER (EMERGENCY)
Age: 39
Discharge: LEFT BEFORE DISCHARGE | End: 2024-01-07
Payer: COMMERCIAL

## 2024-01-07 VITALS
TEMPERATURE: 98.3 F | OXYGEN SATURATION: 100 % | DIASTOLIC BLOOD PRESSURE: 96 MMHG | HEIGHT: 60 IN | BODY MASS INDEX: 21.01 KG/M2 | WEIGHT: 107 LBS | HEART RATE: 69 BPM | SYSTOLIC BLOOD PRESSURE: 161 MMHG | RESPIRATION RATE: 18 BRPM

## 2024-01-07 DIAGNOSIS — I10 HYPERTENSION, UNSPECIFIED TYPE: ICD-10-CM

## 2024-01-07 DIAGNOSIS — O20.0 THREATENED MISCARRIAGE IN EARLY PREGNANCY: ICD-10-CM

## 2024-01-07 DIAGNOSIS — N93.9 VAGINAL BLEEDING: ICD-10-CM

## 2024-01-07 DIAGNOSIS — Z53.21 ELOPED FROM EMERGENCY DEPARTMENT: Primary | ICD-10-CM

## 2024-01-07 LAB
ABO + RH BLD: NORMAL
ALBUMIN SERPL-MCNC: 4.7 G/DL (ref 3.5–5.2)
ALP SERPL-CCNC: 54 U/L (ref 35–104)
ALT SERPL-CCNC: 22 U/L (ref 0–32)
ANION GAP SERPL CALCULATED.3IONS-SCNC: 10 MMOL/L (ref 7–16)
AST SERPL-CCNC: 18 U/L (ref 0–31)
B-HCG SERPL EIA 3RD IS-ACNC: ABNORMAL MIU/ML (ref 0–7)
BASOPHILS # BLD: 0.06 K/UL (ref 0–0.2)
BASOPHILS NFR BLD: 1 % (ref 0–2)
BILIRUB SERPL-MCNC: 0.2 MG/DL (ref 0–1.2)
BILIRUB UR QL STRIP: NEGATIVE
BUN SERPL-MCNC: 10 MG/DL (ref 6–20)
CALCIUM SERPL-MCNC: 9.3 MG/DL (ref 8.6–10.2)
CHLORIDE SERPL-SCNC: 104 MMOL/L (ref 98–107)
CLARITY UR: CLEAR
CO2 SERPL-SCNC: 25 MMOL/L (ref 22–29)
COLOR UR: YELLOW
CREAT SERPL-MCNC: 0.6 MG/DL (ref 0.5–1)
EOSINOPHIL # BLD: 0.1 K/UL (ref 0.05–0.5)
EOSINOPHILS RELATIVE PERCENT: 1 % (ref 0–6)
ERYTHROCYTE [DISTWIDTH] IN BLOOD BY AUTOMATED COUNT: 13.6 % (ref 11.5–15)
GFR SERPL CREATININE-BSD FRML MDRD: >60 ML/MIN/1.73M2
GLUCOSE SERPL-MCNC: 83 MG/DL (ref 74–99)
GLUCOSE UR STRIP-MCNC: NEGATIVE MG/DL
HCT VFR BLD AUTO: 37.6 % (ref 34–48)
HGB BLD-MCNC: 12.3 G/DL (ref 11.5–15.5)
HGB UR QL STRIP.AUTO: ABNORMAL
IMM GRANULOCYTES # BLD AUTO: <0.03 K/UL (ref 0–0.58)
IMM GRANULOCYTES NFR BLD: 0 % (ref 0–5)
KETONES UR STRIP-MCNC: NEGATIVE MG/DL
LEUKOCYTE ESTERASE UR QL STRIP: NEGATIVE
LYMPHOCYTES NFR BLD: 2.44 K/UL (ref 1.5–4)
LYMPHOCYTES RELATIVE PERCENT: 33 % (ref 20–42)
MCH RBC QN AUTO: 29.2 PG (ref 26–35)
MCHC RBC AUTO-ENTMCNC: 32.7 G/DL (ref 32–34.5)
MCV RBC AUTO: 89.3 FL (ref 80–99.9)
MONOCYTES NFR BLD: 0.41 K/UL (ref 0.1–0.95)
MONOCYTES NFR BLD: 6 % (ref 2–12)
NEUTROPHILS NFR BLD: 59 % (ref 43–80)
NEUTS SEG NFR BLD: 4.3 K/UL (ref 1.8–7.3)
NITRITE UR QL STRIP: NEGATIVE
PH UR STRIP: 7 [PH] (ref 5–9)
PLATELET # BLD AUTO: 292 K/UL (ref 130–450)
PMV BLD AUTO: 8.6 FL (ref 7–12)
POTASSIUM SERPL-SCNC: 4.5 MMOL/L (ref 3.5–5)
PROT SERPL-MCNC: 7.3 G/DL (ref 6.4–8.3)
PROT UR STRIP-MCNC: NEGATIVE MG/DL
RBC # BLD AUTO: 4.21 M/UL (ref 3.5–5.5)
RBC #/AREA URNS HPF: ABNORMAL /HPF
SODIUM SERPL-SCNC: 139 MMOL/L (ref 132–146)
SP GR UR STRIP: 1.01 (ref 1–1.03)
UROBILINOGEN UR STRIP-ACNC: 0.2 EU/DL (ref 0–1)
WBC #/AREA URNS HPF: ABNORMAL /HPF
WBC OTHER # BLD: 7.3 K/UL (ref 4.5–11.5)

## 2024-01-07 PROCEDURE — 99284 EMERGENCY DEPT VISIT MOD MDM: CPT

## 2024-01-07 PROCEDURE — 76817 TRANSVAGINAL US OBSTETRIC: CPT

## 2024-01-07 PROCEDURE — 84702 CHORIONIC GONADOTROPIN TEST: CPT

## 2024-01-07 PROCEDURE — 87086 URINE CULTURE/COLONY COUNT: CPT

## 2024-01-07 PROCEDURE — 81001 URINALYSIS AUTO W/SCOPE: CPT

## 2024-01-07 PROCEDURE — 85025 COMPLETE CBC W/AUTO DIFF WBC: CPT

## 2024-01-07 PROCEDURE — 86900 BLOOD TYPING SEROLOGIC ABO: CPT

## 2024-01-07 PROCEDURE — 80053 COMPREHEN METABOLIC PANEL: CPT

## 2024-01-07 PROCEDURE — 86901 BLOOD TYPING SEROLOGIC RH(D): CPT

## 2024-01-07 ASSESSMENT — PAIN - FUNCTIONAL ASSESSMENT: PAIN_FUNCTIONAL_ASSESSMENT: NONE - DENIES PAIN

## 2024-01-07 NOTE — ED TRIAGE NOTES
FIRST PROVIDER CONTACT ASSESSMENT NOTE       Department of Emergency Medicine                 First Provider Note            24  1:11 PM EST    Date of Encounter: No admission date for patient encounter.    Patient Name: Lacy Torres  : 1985  MRN: [id number]    Chief Complaint: Vaginal Bleeding (Vaginal bleeding x2 days, around 9 weeks pregnant. Has not seen OB yet, first appt on Tuesday.  LMP around end , periods are irregular. )      History of Present Illness:   Lacy Torres is a 38 y.o. female who presents to the ED for vaginal bleeding during early pregnancy.    States she is about 9 weeks along.   States she has appointment with Dr. Maldonado on Tuesday.   Blood type uncertain     Focused Physical Exam:  VS:    ED Triage Vitals   BP Temp Temp src Pulse Resp SpO2 Height Weight   -- -- -- -- -- -- -- --        Physical Ex: Constitutional: Alert and non-toxic.    Medical History:  has a past medical history of Alcohol dependency (HCC), Anxiety and depression, Hypertension, and Psychiatric problem.  Surgical History:  has a past surgical history that includes [place] tooth extraction and Dilation and curettage of uterus (N/A, 2023).  Social History:  reports that she has never smoked. She has never used smokeless tobacco. She reports that she does not currently use alcohol. She reports that she does not use drugs.  Family History: family history includes Anxiety Disorder in her maternal aunt and mother; Depression in her maternal aunt and mother.    Allergies: Patient has no known allergies.     Initial Plan of Care: Initiate Treatment-Testing, Proceed toTreatment Area When Bed Available for ED Attending/MLP to Continue Care      ---END OF FIRST PROVIDER CONTACT ASSESSMENT NOTE---  Electronically signed by Catrachita Siegel PA-C   DD: 24

## 2024-01-07 NOTE — ED PROVIDER NOTES
48.0 %    MCV 89.3 80.0 - 99.9 fL    MCH 29.2 26.0 - 35.0 pg    MCHC 32.7 32.0 - 34.5 g/dL    RDW 13.6 11.5 - 15.0 %    Platelets 292 130 - 450 k/uL    MPV 8.6 7.0 - 12.0 fL    Neutrophils % 59 43.0 - 80.0 %    Lymphocytes % 33 20.0 - 42.0 %    Monocytes % 6 2.0 - 12.0 %    Eosinophils % 1 0 - 6 %    Basophils % 1 0.0 - 2.0 %    Immature Granulocytes 0 0.0 - 5.0 %    Neutrophils Absolute 4.30 1.80 - 7.30 k/uL    Lymphocytes Absolute 2.44 1.50 - 4.00 k/uL    Monocytes Absolute 0.41 0.10 - 0.95 k/uL    Eosinophils Absolute 0.10 0.05 - 0.50 k/uL    Basophils Absolute 0.06 0.00 - 0.20 k/uL    Absolute Immature Granulocyte <0.03 0.00 - 0.58 k/uL   CMP   Result Value Ref Range    Sodium 139 132 - 146 mmol/L    Potassium 4.5 3.5 - 5.0 mmol/L    Chloride 104 98 - 107 mmol/L    CO2 25 22 - 29 mmol/L    Anion Gap 10 7 - 16 mmol/L    Glucose 83 74 - 99 mg/dL    BUN 10 6 - 20 mg/dL    Creatinine 0.6 0.50 - 1.00 mg/dL    Est, Glom Filt Rate >60 >60 mL/min/1.73m2    Calcium 9.3 8.6 - 10.2 mg/dL    Total Protein 7.3 6.4 - 8.3 g/dL    Albumin 4.7 3.5 - 5.2 g/dL    Total Bilirubin 0.2 0.0 - 1.2 mg/dL    Alkaline Phosphatase 54 35 - 104 U/L    ALT 22 0 - 32 U/L    AST 18 0 - 31 U/L   Urinalysis with Microscopic   Result Value Ref Range    Color, UA Yellow Yellow    Turbidity UA Clear Clear    Glucose, Ur NEGATIVE NEGATIVE mg/dL    Bilirubin Urine NEGATIVE NEGATIVE    Ketones, Urine NEGATIVE NEGATIVE mg/dL    Specific Gravity, UA 1.010 1.005 - 1.030    Urine Hgb TRACE (A) NEGATIVE    pH, UA 7.0 5.0 - 9.0    Protein, UA NEGATIVE NEGATIVE mg/dL    Urobilinogen, Urine 0.2 0.0 - 1.0 EU/dL    Nitrite, Urine NEGATIVE NEGATIVE    Leukocyte Esterase, Urine NEGATIVE NEGATIVE    WBC, UA 0 TO 5 0 TO 5 /HPF    RBC, UA 0 TO 2 0 TO 2 /HPF   hCG, quantitative, pregnancy   Result Value Ref Range    hCG Quant 15,405.0 (H) 0 - 7 mIU/mL   ABO/RH   Result Value Ref Range    ABO/Rh O POSITIVE      Imaging:  All Radiology results interpreted by Radiologist

## 2024-01-08 LAB
MICROORGANISM SPEC CULT: NO GROWTH
SPECIMEN DESCRIPTION: NORMAL

## 2024-01-09 ENCOUNTER — HOSPITAL ENCOUNTER (OUTPATIENT)
Age: 39
Discharge: HOME | End: 2024-01-09
Payer: COMMERCIAL

## 2024-01-09 LAB — B-HCG SERPL EIA 3RD IS-ACNC: 3569 MIU/ML (ref 0–7)

## 2024-01-09 PROCEDURE — 36415 COLL VENOUS BLD VENIPUNCTURE: CPT

## 2024-01-09 PROCEDURE — 84702 CHORIONIC GONADOTROPIN TEST: CPT

## (undated) DEVICE — KIT PREP VAG WET SKIN SCRB CURITY

## (undated) DEVICE — GLOVE ORANGE PI 7 1/2   MSG9075

## (undated) DEVICE — TOWEL,OR,DSP,ST,BLUE,STD,6/PK,12PK/CS: Brand: MEDLINE

## (undated) DEVICE — COVER,LIGHT HANDLE,FLX,2/PK: Brand: MEDLINE INDUSTRIES, INC.

## (undated) DEVICE — DRAPE OBSTETRIC W40XL44IN NONFENESTRATED NONREINFORCED W FLD

## (undated) DEVICE — GLOVE SURG SZ 65 THK91MIL LTX FREE SYN POLYISOPRENE

## (undated) DEVICE — GAUZE,SPONGE,4"X4",16PLY,XRAY,STRL,LF: Brand: MEDLINE

## (undated) DEVICE — LEGGINGS: Brand: CONVERTORS

## (undated) DEVICE — SHEET,DRAPE,53X77,STERILE: Brand: MEDLINE

## (undated) DEVICE — GOWN,SIRUS,FABRNF,L,20/CS: Brand: MEDLINE